# Patient Record
Sex: MALE | Race: WHITE | Employment: FULL TIME | URBAN - METROPOLITAN AREA
[De-identification: names, ages, dates, MRNs, and addresses within clinical notes are randomized per-mention and may not be internally consistent; named-entity substitution may affect disease eponyms.]

---

## 2018-04-09 ENCOUNTER — APPOINTMENT (OUTPATIENT)
Dept: GENERAL RADIOLOGY | Age: 63
DRG: 190 | End: 2018-04-09
Attending: EMERGENCY MEDICINE
Payer: COMMERCIAL

## 2018-04-09 ENCOUNTER — HOSPITAL ENCOUNTER (INPATIENT)
Age: 63
LOS: 1 days | Discharge: HOME | DRG: 190 | End: 2018-04-10
Attending: EMERGENCY MEDICINE | Admitting: HOSPITALIST
Payer: COMMERCIAL

## 2018-04-09 DIAGNOSIS — J44.1 COPD EXACERBATION (HCC): Primary | ICD-10-CM

## 2018-04-09 DIAGNOSIS — Z72.0 TOBACCO ABUSE: Chronic | ICD-10-CM

## 2018-04-09 DIAGNOSIS — J96.22 ACUTE ON CHRONIC RESPIRATORY FAILURE WITH HYPOXIA AND HYPERCAPNIA (HCC): ICD-10-CM

## 2018-04-09 DIAGNOSIS — J96.01 ACUTE RESPIRATORY FAILURE WITH HYPOXIA AND HYPERCAPNIA (HCC): ICD-10-CM

## 2018-04-09 DIAGNOSIS — J96.21 ACUTE ON CHRONIC RESPIRATORY FAILURE WITH HYPOXIA AND HYPERCAPNIA (HCC): ICD-10-CM

## 2018-04-09 DIAGNOSIS — J96.02 ACUTE RESPIRATORY FAILURE WITH HYPOXIA AND HYPERCAPNIA (HCC): ICD-10-CM

## 2018-04-09 PROBLEM — Z88.9 H/O SEASONAL ALLERGIES: Chronic | Status: ACTIVE | Noted: 2018-04-09

## 2018-04-09 PROBLEM — J44.9 COPD (CHRONIC OBSTRUCTIVE PULMONARY DISEASE) (HCC): Status: ACTIVE | Noted: 2018-04-09

## 2018-04-09 PROBLEM — J44.9 COPD (CHRONIC OBSTRUCTIVE PULMONARY DISEASE) (HCC): Status: RESOLVED | Noted: 2018-04-09 | Resolved: 2018-04-09

## 2018-04-09 LAB
ALBUMIN SERPL-MCNC: 3.6 G/DL (ref 3.2–4.6)
ALBUMIN/GLOB SERPL: 0.8 {RATIO} (ref 1.2–3.5)
ALP SERPL-CCNC: 91 U/L (ref 50–136)
ALT SERPL-CCNC: 52 U/L (ref 12–65)
ANION GAP SERPL CALC-SCNC: 10 MMOL/L (ref 7–16)
ARTERIAL PATENCY WRIST A: POSITIVE
ARTERIAL PATENCY WRIST A: POSITIVE
AST SERPL-CCNC: 35 U/L (ref 15–37)
ATRIAL RATE: 127 BPM
BASE DEFICIT BLDA-SCNC: 1.5 MMOL/L (ref 0–2)
BASE DEFICIT BLDA-SCNC: 2.8 MMOL/L (ref 0–2)
BASOPHILS # BLD: 0.2 K/UL (ref 0–0.2)
BASOPHILS NFR BLD: 1 % (ref 0–2)
BDY SITE: ABNORMAL
BDY SITE: NORMAL
BILIRUB SERPL-MCNC: 0.3 MG/DL (ref 0.2–1.1)
BUN SERPL-MCNC: 20 MG/DL (ref 8–23)
CALCIUM SERPL-MCNC: 8.6 MG/DL (ref 8.3–10.4)
CALCULATED P AXIS, ECG09: 77 DEGREES
CALCULATED R AXIS, ECG10: 77 DEGREES
CALCULATED T AXIS, ECG11: 102 DEGREES
CHLORIDE SERPL-SCNC: 104 MMOL/L (ref 98–107)
CO2 SERPL-SCNC: 27 MMOL/L (ref 21–32)
COHGB MFR BLD: 0.5 % (ref 0.5–1.5)
CREAT SERPL-MCNC: 0.89 MG/DL (ref 0.8–1.5)
DIAGNOSIS, 93000: NORMAL
DIFFERENTIAL METHOD BLD: ABNORMAL
DO-HGB BLD-MCNC: 11 % (ref 0–5)
EOSINOPHIL # BLD: 4 K/UL (ref 0–0.8)
EOSINOPHIL NFR BLD: 24 % (ref 0.5–7.8)
ERYTHROCYTE [DISTWIDTH] IN BLOOD BY AUTOMATED COUNT: 14.7 % (ref 11.9–14.6)
GAS FLOW.O2 O2 DELIVERY SYS: 40 L/MIN
GLOBULIN SER CALC-MCNC: 4.6 G/DL (ref 2.3–3.5)
GLUCOSE SERPL-MCNC: 202 MG/DL (ref 65–100)
HCO3 BLDA-SCNC: 24 MMOL/L (ref 22–26)
HCO3 BLDA-SCNC: 25 MMOL/L (ref 22–26)
HCT VFR BLD AUTO: 47.4 % (ref 41.1–50.3)
HGB BLD-MCNC: 16.1 G/DL (ref 13.6–17.2)
HGB BLDMV-MCNC: 16.6 GM/DL (ref 11.7–15)
IMM GRANULOCYTES # BLD: 0.1 K/UL (ref 0–0.5)
IMM GRANULOCYTES NFR BLD AUTO: 0 % (ref 0–5)
LYMPHOCYTES # BLD: 4.3 K/UL (ref 0.5–4.6)
LYMPHOCYTES NFR BLD: 26 % (ref 13–44)
MAGNESIUM SERPL-MCNC: 1.9 MG/DL (ref 1.8–2.4)
MCH RBC QN AUTO: 30.1 PG (ref 26.1–32.9)
MCHC RBC AUTO-ENTMCNC: 34 G/DL (ref 31.4–35)
MCV RBC AUTO: 88.8 FL (ref 79.6–97.8)
METHGB MFR BLD: 0.1 % (ref 0–1.5)
MONOCYTES # BLD: 1.4 K/UL (ref 0.1–1.3)
MONOCYTES NFR BLD: 9 % (ref 4–12)
NEUTS SEG # BLD: 6.7 K/UL (ref 1.7–8.2)
NEUTS SEG NFR BLD: 40 % (ref 43–78)
OXYHGB MFR BLDA: 88 % (ref 94–97)
PCO2 BLDA: 45 MMHG (ref 35–45)
PCO2 BLDA: 53 MMHG (ref 35–45)
PH BLDA: 7.29 [PH] (ref 7.35–7.45)
PH BLDA: 7.35 [PH] (ref 7.35–7.45)
PLATELET # BLD AUTO: 610 K/UL (ref 150–450)
PMV BLD AUTO: 9.8 FL (ref 10.8–14.1)
PO2 BLDA: 57 MMHG (ref 80–105)
PO2 BLDA: 82 MMHG (ref 80–105)
POTASSIUM SERPL-SCNC: 3.6 MMOL/L (ref 3.5–5.1)
PROT SERPL-MCNC: 8.2 G/DL (ref 6.3–8.2)
Q-T INTERVAL, ECG07: 288 MS
QRS DURATION, ECG06: 80 MS
QTC CALCULATION (BEZET), ECG08: 405 MS
RBC # BLD AUTO: 5.34 M/UL (ref 4.23–5.67)
SAO2 % BLD: 89 % (ref 92–98.5)
SERVICE CMNT-IMP: ABNORMAL
SERVICE CMNT-IMP: NORMAL
SODIUM SERPL-SCNC: 141 MMOL/L (ref 136–145)
TROPONIN I SERPL-MCNC: <0.04 NG/ML (ref 0.02–0.05)
VENTILATION MODE VENT: NORMAL
VENTRICULAR RATE, ECG03: 119 BPM
WBC # BLD AUTO: 16.7 K/UL (ref 4.3–11.1)

## 2018-04-09 PROCEDURE — 74011250637 HC RX REV CODE- 250/637: Performed by: HOSPITALIST

## 2018-04-09 PROCEDURE — 94640 AIRWAY INHALATION TREATMENT: CPT

## 2018-04-09 PROCEDURE — 77030012793 HC CIRC VNTLTR FISP -B

## 2018-04-09 PROCEDURE — 74011250636 HC RX REV CODE- 250/636: Performed by: HOSPITALIST

## 2018-04-09 PROCEDURE — 74011000250 HC RX REV CODE- 250

## 2018-04-09 PROCEDURE — 99284 EMERGENCY DEPT VISIT MOD MDM: CPT | Performed by: EMERGENCY MEDICINE

## 2018-04-09 PROCEDURE — 77030021668 HC NEB PREFIL KT VYRM -A

## 2018-04-09 PROCEDURE — 65270000029 HC RM PRIVATE

## 2018-04-09 PROCEDURE — 99254 IP/OBS CNSLTJ NEW/EST MOD 60: CPT | Performed by: INTERNAL MEDICINE

## 2018-04-09 PROCEDURE — 74011000250 HC RX REV CODE- 250: Performed by: EMERGENCY MEDICINE

## 2018-04-09 PROCEDURE — 85025 COMPLETE CBC W/AUTO DIFF WBC: CPT | Performed by: EMERGENCY MEDICINE

## 2018-04-09 PROCEDURE — 77030012794 HC KT NSL CANN/HGH TRAN -A

## 2018-04-09 PROCEDURE — 77030027138 HC INCENT SPIROMETER -A

## 2018-04-09 PROCEDURE — 94760 N-INVAS EAR/PLS OXIMETRY 1: CPT

## 2018-04-09 PROCEDURE — 87070 CULTURE OTHR SPECIMN AEROBIC: CPT | Performed by: HOSPITALIST

## 2018-04-09 PROCEDURE — 77030032490 HC SLV COMPR SCD KNE COVD -B

## 2018-04-09 PROCEDURE — 82803 BLOOD GASES ANY COMBINATION: CPT

## 2018-04-09 PROCEDURE — 77030013140 HC MSK NEB VYRM -A

## 2018-04-09 PROCEDURE — 77030020120 HC VLV RESP PEP HI -B

## 2018-04-09 PROCEDURE — 77010033678 HC OXYGEN DAILY

## 2018-04-09 PROCEDURE — 71045 X-RAY EXAM CHEST 1 VIEW: CPT

## 2018-04-09 PROCEDURE — 84484 ASSAY OF TROPONIN QUANT: CPT | Performed by: EMERGENCY MEDICINE

## 2018-04-09 PROCEDURE — 96374 THER/PROPH/DIAG INJ IV PUSH: CPT | Performed by: EMERGENCY MEDICINE

## 2018-04-09 PROCEDURE — 74011250636 HC RX REV CODE- 250/636: Performed by: EMERGENCY MEDICINE

## 2018-04-09 PROCEDURE — 80053 COMPREHEN METABOLIC PANEL: CPT | Performed by: EMERGENCY MEDICINE

## 2018-04-09 PROCEDURE — 36600 WITHDRAWAL OF ARTERIAL BLOOD: CPT

## 2018-04-09 PROCEDURE — 74011000250 HC RX REV CODE- 250: Performed by: HOSPITALIST

## 2018-04-09 PROCEDURE — 83735 ASSAY OF MAGNESIUM: CPT | Performed by: EMERGENCY MEDICINE

## 2018-04-09 PROCEDURE — 94761 N-INVAS EAR/PLS OXIMETRY MLT: CPT | Performed by: EMERGENCY MEDICINE

## 2018-04-09 PROCEDURE — 93005 ELECTROCARDIOGRAM TRACING: CPT | Performed by: EMERGENCY MEDICINE

## 2018-04-09 RX ORDER — ALBUTEROL SULFATE 0.83 MG/ML
5 SOLUTION RESPIRATORY (INHALATION)
Status: DISPENSED | OUTPATIENT
Start: 2018-04-09 | End: 2018-04-09

## 2018-04-09 RX ORDER — IPRATROPIUM BROMIDE AND ALBUTEROL SULFATE 2.5; .5 MG/3ML; MG/3ML
3 SOLUTION RESPIRATORY (INHALATION)
Status: COMPLETED | OUTPATIENT
Start: 2018-04-09 | End: 2018-04-09

## 2018-04-09 RX ORDER — MORPHINE SULFATE 2 MG/ML
2 INJECTION, SOLUTION INTRAMUSCULAR; INTRAVENOUS
Status: DISCONTINUED | OUTPATIENT
Start: 2018-04-09 | End: 2018-04-10 | Stop reason: HOSPADM

## 2018-04-09 RX ORDER — HYDROCODONE BITARTRATE AND ACETAMINOPHEN 5; 325 MG/1; MG/1
1 TABLET ORAL
Status: DISCONTINUED | OUTPATIENT
Start: 2018-04-09 | End: 2018-04-10 | Stop reason: HOSPADM

## 2018-04-09 RX ORDER — MAGNESIUM SULFATE 1 G/100ML
1 INJECTION INTRAVENOUS ONCE
Status: COMPLETED | OUTPATIENT
Start: 2018-04-09 | End: 2018-04-09

## 2018-04-09 RX ORDER — SODIUM CHLORIDE 0.9 % (FLUSH) 0.9 %
5-10 SYRINGE (ML) INJECTION EVERY 8 HOURS
Status: DISCONTINUED | OUTPATIENT
Start: 2018-04-09 | End: 2018-04-10 | Stop reason: HOSPADM

## 2018-04-09 RX ORDER — LORATADINE 10 MG/1
10 TABLET ORAL DAILY
Status: DISCONTINUED | OUTPATIENT
Start: 2018-04-09 | End: 2018-04-10 | Stop reason: HOSPADM

## 2018-04-09 RX ORDER — IPRATROPIUM BROMIDE AND ALBUTEROL SULFATE 2.5; .5 MG/3ML; MG/3ML
3 SOLUTION RESPIRATORY (INHALATION)
Status: DISCONTINUED | OUTPATIENT
Start: 2018-04-09 | End: 2018-04-09

## 2018-04-09 RX ORDER — HEPARIN SODIUM 5000 [USP'U]/ML
5000 INJECTION, SOLUTION INTRAVENOUS; SUBCUTANEOUS EVERY 8 HOURS
Status: DISCONTINUED | OUTPATIENT
Start: 2018-04-09 | End: 2018-04-10 | Stop reason: HOSPADM

## 2018-04-09 RX ORDER — SODIUM CHLORIDE 0.9 % (FLUSH) 0.9 %
5-10 SYRINGE (ML) INJECTION AS NEEDED
Status: DISCONTINUED | OUTPATIENT
Start: 2018-04-09 | End: 2018-04-10 | Stop reason: HOSPADM

## 2018-04-09 RX ORDER — ONDANSETRON 2 MG/ML
4 INJECTION INTRAMUSCULAR; INTRAVENOUS
Status: DISCONTINUED | OUTPATIENT
Start: 2018-04-09 | End: 2018-04-10 | Stop reason: HOSPADM

## 2018-04-09 RX ORDER — IPRATROPIUM BROMIDE AND ALBUTEROL SULFATE 2.5; .5 MG/3ML; MG/3ML
3 SOLUTION RESPIRATORY (INHALATION)
Status: DISCONTINUED | OUTPATIENT
Start: 2018-04-09 | End: 2018-04-10

## 2018-04-09 RX ORDER — ACETAMINOPHEN 325 MG/1
650 TABLET ORAL
Status: DISCONTINUED | OUTPATIENT
Start: 2018-04-09 | End: 2018-04-10 | Stop reason: HOSPADM

## 2018-04-09 RX ORDER — GUAIFENESIN 600 MG/1
1200 TABLET, EXTENDED RELEASE ORAL EVERY 12 HOURS
Status: DISCONTINUED | OUTPATIENT
Start: 2018-04-09 | End: 2018-04-10 | Stop reason: HOSPADM

## 2018-04-09 RX ORDER — BUDESONIDE 0.5 MG/2ML
500 INHALANT ORAL
Status: DISCONTINUED | OUTPATIENT
Start: 2018-04-09 | End: 2018-04-10 | Stop reason: HOSPADM

## 2018-04-09 RX ORDER — IPRATROPIUM BROMIDE AND ALBUTEROL SULFATE 2.5; .5 MG/3ML; MG/3ML
SOLUTION RESPIRATORY (INHALATION)
Status: COMPLETED
Start: 2018-04-09 | End: 2018-04-09

## 2018-04-09 RX ORDER — LEVOFLOXACIN 5 MG/ML
750 INJECTION, SOLUTION INTRAVENOUS EVERY 24 HOURS
Status: DISCONTINUED | OUTPATIENT
Start: 2018-04-09 | End: 2018-04-10

## 2018-04-09 RX ORDER — MONTELUKAST SODIUM 10 MG/1
10 TABLET ORAL
Status: DISCONTINUED | OUTPATIENT
Start: 2018-04-09 | End: 2018-04-10 | Stop reason: HOSPADM

## 2018-04-09 RX ORDER — ALBUTEROL SULFATE 0.83 MG/ML
10 SOLUTION RESPIRATORY (INHALATION)
Status: COMPLETED | OUTPATIENT
Start: 2018-04-09 | End: 2018-04-09

## 2018-04-09 RX ADMIN — HYDROCODONE BITARTRATE AND ACETAMINOPHEN 1 TABLET: 5; 325 TABLET ORAL at 13:41

## 2018-04-09 RX ADMIN — HEPARIN SODIUM 5000 UNITS: 5000 INJECTION, SOLUTION INTRAVENOUS; SUBCUTANEOUS at 05:21

## 2018-04-09 RX ADMIN — Medication 10 ML: at 11:11

## 2018-04-09 RX ADMIN — METHYLPREDNISOLONE SODIUM SUCCINATE 80 MG: 125 INJECTION, POWDER, FOR SOLUTION INTRAMUSCULAR; INTRAVENOUS at 15:14

## 2018-04-09 RX ADMIN — CHLORASEPTIC 1 SPRAY: 1.5 LIQUID ORAL at 23:00

## 2018-04-09 RX ADMIN — IPRATROPIUM BROMIDE AND ALBUTEROL SULFATE 3 ML: 2.5; .5 SOLUTION RESPIRATORY (INHALATION) at 02:18

## 2018-04-09 RX ADMIN — MONTELUKAST SODIUM 10 MG: 10 TABLET, FILM COATED ORAL at 05:22

## 2018-04-09 RX ADMIN — MONTELUKAST SODIUM 10 MG: 10 TABLET, FILM COATED ORAL at 21:04

## 2018-04-09 RX ADMIN — MORPHINE SULFATE 2 MG: 2 INJECTION, SOLUTION INTRAMUSCULAR; INTRAVENOUS at 11:11

## 2018-04-09 RX ADMIN — ALBUTEROL SULFATE 10 MG: 2.5 SOLUTION RESPIRATORY (INHALATION) at 02:42

## 2018-04-09 RX ADMIN — MAGNESIUM SULFATE HEPTAHYDRATE 1 G: 1 INJECTION, SOLUTION INTRAVENOUS at 05:23

## 2018-04-09 RX ADMIN — LORATADINE 10 MG: 10 TABLET ORAL at 05:22

## 2018-04-09 RX ADMIN — BUDESONIDE 500 MCG: 0.5 INHALANT RESPIRATORY (INHALATION) at 20:49

## 2018-04-09 RX ADMIN — IPRATROPIUM BROMIDE AND ALBUTEROL SULFATE 3 ML: 2.5; .5 SOLUTION RESPIRATORY (INHALATION) at 07:57

## 2018-04-09 RX ADMIN — GUAIFENESIN 1200 MG: 600 TABLET, EXTENDED RELEASE ORAL at 09:24

## 2018-04-09 RX ADMIN — Medication 10 ML: at 15:14

## 2018-04-09 RX ADMIN — Medication 10 ML: at 21:10

## 2018-04-09 RX ADMIN — HEPARIN SODIUM 5000 UNITS: 5000 INJECTION, SOLUTION INTRAVENOUS; SUBCUTANEOUS at 21:10

## 2018-04-09 RX ADMIN — Medication 10 ML: at 21:06

## 2018-04-09 RX ADMIN — IPRATROPIUM BROMIDE AND ALBUTEROL SULFATE 3 ML: 2.5; .5 SOLUTION RESPIRATORY (INHALATION) at 20:49

## 2018-04-09 RX ADMIN — IPRATROPIUM BROMIDE AND ALBUTEROL SULFATE 3 ML: 2.5; .5 SOLUTION RESPIRATORY (INHALATION) at 11:16

## 2018-04-09 RX ADMIN — Medication 10 ML: at 05:23

## 2018-04-09 RX ADMIN — METHYLPREDNISOLONE SODIUM SUCCINATE 80 MG: 125 INJECTION, POWDER, FOR SOLUTION INTRAMUSCULAR; INTRAVENOUS at 09:24

## 2018-04-09 RX ADMIN — HEPARIN SODIUM 5000 UNITS: 5000 INJECTION, SOLUTION INTRAVENOUS; SUBCUTANEOUS at 13:41

## 2018-04-09 RX ADMIN — BUDESONIDE 500 MCG: 0.5 INHALANT RESPIRATORY (INHALATION) at 07:56

## 2018-04-09 RX ADMIN — METHYLPREDNISOLONE SODIUM SUCCINATE 80 MG: 125 INJECTION, POWDER, FOR SOLUTION INTRAMUSCULAR; INTRAVENOUS at 21:06

## 2018-04-09 RX ADMIN — GUAIFENESIN 1200 MG: 600 TABLET, EXTENDED RELEASE ORAL at 21:04

## 2018-04-09 RX ADMIN — IPRATROPIUM BROMIDE AND ALBUTEROL SULFATE 3 ML: 2.5; .5 SOLUTION RESPIRATORY (INHALATION) at 02:24

## 2018-04-09 RX ADMIN — METHYLPREDNISOLONE SODIUM SUCCINATE 125 MG: 125 INJECTION, POWDER, FOR SOLUTION INTRAMUSCULAR; INTRAVENOUS at 02:32

## 2018-04-09 RX ADMIN — IPRATROPIUM BROMIDE AND ALBUTEROL SULFATE 3 ML: 2.5; .5 SOLUTION RESPIRATORY (INHALATION) at 15:41

## 2018-04-09 RX ADMIN — LEVOFLOXACIN 750 MG: 5 INJECTION, SOLUTION INTRAVENOUS at 05:22

## 2018-04-09 RX ADMIN — MORPHINE SULFATE 2 MG: 2 INJECTION, SOLUTION INTRAMUSCULAR; INTRAVENOUS at 05:12

## 2018-04-09 NOTE — ED PROVIDER NOTES
HPI Comments: History is obtained from the patient's wife. She states that he stopped smoking 11 days ago, has COPD. He uses 2 different inhalers at home. Since he stopped smoking, his breathing seems to have gotten worse. He has been coughing incessantly per his wife. His symptoms continue to get worse until tonight when she got concerned and brought him here. The patient currently is unable to provide any meaningful history due to his severe storied distress. Elements of this note were created using speech recognition software. As such, errors of speech recognition may be present. Patient is a 58 y.o. male presenting with respiratory distress syndrome. The history is provided by the spouse. The history is limited by the condition of the patient. Respiratory Distress   Associated symptoms include cough. Pertinent negatives include no fever and no vomiting. Past Medical History:   Diagnosis Date    Allergic rhinitis     mild    Attention deficit hyperactivity disorder (ADHD) 12/20/2016    BPPV (benign paroxysmal positional vertigo) 12/20/2016    Chronic obstructive pulmonary disease ([address]     Ocular migraine 12/20/2016       No past surgical history on file. Family History:   Problem Relation Age of Onset    No Known Problems Mother      shingles    No Known Problems Father        Social History     Social History    Marital status:      Spouse name: N/A    Number of children: N/A    Years of education: N/A     Occupational History    Not on file.      Social History Main Topics    Smoking status: Former Smoker     Packs/day: 1.00     Years: 40.00     Types: Cigarettes     Quit date: 3/30/2018    Smokeless tobacco: Never Used      Comment: tried to quit in past, wellbutrin, patches,     Alcohol use Yes      Comment: Very Rare    Drug use: Not on file    Sexual activity: Not on file     Other Topics Concern    Blood Transfusions No    Caffeine Concern No     2-3 c coffee, no soda    Exercise No      active in work, up and down stairs     Social History Narrative    Malini Churchill- Wife        Currently Lluvia Mitchell,  -->  supervisor                 ALLERGIES: Review of patient's allergies indicates no known allergies. Review of Systems   Constitutional: Negative for chills and fever. Respiratory: Positive for cough and shortness of breath. Gastrointestinal: Negative for nausea and vomiting. All other systems reviewed and are negative. Vitals:    04/09/18 0220 04/09/18 0221   BP: (!) 169/105    Pulse: (!) 104    Resp: 26    SpO2: (!) 81% 93%   Weight: 66.7 kg (147 lb)    Height: 5' 8.5\" (1.74 m)             Physical Exam   Constitutional: He appears well-developed and well-nourished. He appears distressed. Thin white male who appears in acute respiratory distress. Unable to speak   HENT:   Head: Normocephalic and atraumatic. Eyes: Conjunctivae are normal. Pupils are equal, round, and reactive to light. Neck: Normal range of motion. Neck supple. Pulmonary/Chest: He is in respiratory distress. He has wheezes. He has no rales. Abdominal: Soft. Bowel sounds are normal.   Musculoskeletal: He exhibits no edema or tenderness. Skin: Skin is warm and dry. Nursing note and vitals reviewed. MDM  Number of Diagnoses or Management Options  Diagnosis management comments: 2:40 AM patient is currently on a continuous neb, will get ABG  3:35 AM ABG shows pCO2 52.5 and pO2 56.6. Will continue albuterol treatment  3:51 AM Pt appears more comfortable, is able to speak in 3-4 word sentences. He still has extensive wheezing and is requiring oxygen. I spoke with Dr. Grace Platt, to see patient for admission. At this point I do not feel that he needs BiPAP.     Total critical care time spent on initial evaluation, repeat evaluations, interpreting lab results and chest x-ray, obtaining additional history from family and speaking with consultants was 39 minutes       Amount and/or Complexity of Data Reviewed  Clinical lab tests: ordered and reviewed  Tests in the radiology section of CPT®: ordered and reviewed  Tests in the medicine section of CPT®: ordered and reviewed  Discuss the patient with other providers: yes  Independent visualization of images, tracings, or specimens: yes    Risk of Complications, Morbidity, and/or Mortality  Presenting problems: high  Diagnostic procedures: moderate  Management options: high    Critical Care  Total time providing critical care: 30-74 minutes    Patient Progress  Patient progress: improved        ED Course       Procedures

## 2018-04-09 NOTE — Clinical Note
Status[de-identified] Inpatient [101] Type of Bed: Telemetry [19] Inpatient Hospitalization Certified Necessary for the Following Reasons: 3. Patient receiving treatment that can only be provided in an inpatient setting (further clarification in H&P documentation) Admitting Diagnosis: COPD (chronic obstructive pulmonary disease) ([facility] [[id number]] Admitting Physician: Karen Tijerina [[id number]] Attending Physician: Karen Tijerina [[id number]] Estimated Length of Stay: 3-4 Midnights Discharge Plan[de-identified] Home with Office Follow-up

## 2018-04-09 NOTE — PROGRESS NOTES
Problem: Breathing Pattern - Ineffective  Goal: *Absence of hypoxia  Outcome: Progressing Towards Goal  Able to wean pt from Airvo to 3L NC. No increased WOB or SOB noted. Will monitor.

## 2018-04-09 NOTE — PROGRESS NOTES
Mr. Idalmis Hay is a 57 yo M with PMHx of COPD (diagnosed in 2017 - not seen by pulmonary), ADHD who presented to ED with increased SOB and expiratory wheezing for last 10-11 days. She was diagnosed with COPD exacerbation secondary to viral URI and started on DuoNebs Q4H, steroids and levofloxacin. This morning, he reports improvement in dyspnea and cough. He has been weaned to 2L via NC. PT/OT was consulted. He continues to have bilateral wheezing.

## 2018-04-09 NOTE — PROGRESS NOTES
Care Management Interventions  Transition of Care Consult (CM Consult): Discharge Planning  Current Support Network: Lives with Spouse  Plan discussed with Pt/Family/Caregiver: Yes  Nahum - 58 M admitted 4/9 with ARF to ICU. Hx COPD. Lives at home with spouse. Plans to return home. Goal is to get out of bed today (4/9). Has PCP. Does not have O2 at home. Has PCP.

## 2018-04-09 NOTE — PROGRESS NOTES
58 M admitted 4/9 with ARF. Hx COPD. Lives at home with spouse. Plans to return home with spouse. Has PCP.

## 2018-04-09 NOTE — PROGRESS NOTES
Pt admitted to room 373 per stretcher from ER with oxygen and nurse. Pt able to transfer self to bed. Pt very short of breath with activity and at rest. Pt given CHG bath. Pt placed on monitor. Pt asked not to get up out of bed without calling for assistance. No skin breakdown. Urinal at bedside. Specimen cup at bedside, pt instructed to call when he has used cup. Audible wheezing. RT at bedside for treatment. All medications given. levaquin infusing. New iv to right forearm, 20 G for magnesium bolus.

## 2018-04-09 NOTE — IP AVS SNAPSHOT
[address] 
[phone] Patient: Kendell Robins MRN: [id number] :[id number] About your hospitalization You were admitted on:  2018 You last received care in the:  [address] [place] [address] You were discharged on:  April 10, 2018 Why you were hospitalized Your primary diagnosis was:  Copd (Chronic Obstructive Pulmonary Disease) (Hcc) Your diagnoses also included:  Acute Respiratory Failure With Hypoxia And Hypercapnia (Hcc), Tobacco Abuse, H/O Seasonal Allergies, Copd Exacerbation ([facility]) Follow-up Information Follow up With Details Comments Contact Info Linda Lawton MD In 1 month APPT. MAY 10th @ 9:30 WILL SEE  [facility] [address] [facility] [address] 
[phone] Felipa Fonseca MD On 2018 9:45AM 12 [facility] [address] 
[phone] Your Scheduled Appointments 2018  9:45 AM EDT Extended Office Visit with Felipa Fonseca MD  
[facility] ([address]) [facility] 700 [facility]  
[phone] Thursday May 10, 2018 10:00 AM EDT  
(Arrive by 9:30 AM) HOSPITAL with [facility] ([facility]) 75 [facility] [address]  
[phone] Discharge Orders None A check chica indicates which time of day the medication should be taken. My Medications START taking these medications Instructions Each Dose to Equal  
 Morning Noon Evening Bedtime  
 levoFLOXacin 500 mg tablet Commonly known as:  Donovan Formosa Start taking on:  2018 Your next dose is:  TOMORROW Take 1 Tab by mouth every twenty-four (24) hours for 2 days. 500 mg  
    
   
   
   
  
 montelukast 10 mg tablet Commonly known as:  SINGULAIR  
 Your next dose is:  TODAY Take 1 Tab by mouth nightly. 10 mg  
    
   
   
   
  
  
 predniSONE 20 mg tablet Commonly known as:  Alcario Shires Start taking on:  4/11/2018 Your next dose is:  TOMORROW Take 2 Tabs by mouth daily (with breakfast) for 2 days. 40 mg CONTINUE taking these medications Instructions Each Dose to Equal  
 Morning Noon Evening Bedtime  
 ipratropium-albuterol  mcg/actuation inhaler Commonly known as:  Julieta Nest Take 1 Puff by inhalation every six (6) hours as needed for Wheezing. 1 Puff  
    
   
   
   
  
 umeclidinium-vilanterol 62.5-25 mcg/actuation inhaler Commonly known as:  Tello Crutch Your next dose is:  TOMORROW Take 1 Puff by inhalation daily. 1 Puff Where to Get Your Medications Information on where to get these meds will be given to you by the nurse or doctor. ! Ask your nurse or doctor about these medications  
  levoFLOXacin 500 mg tablet  
 montelukast 10 mg tablet  
 predniSONE 20 mg tablet Discharge Instructions DISCHARGE SUMMARY from Nurse The following personal items are in your possession at time of discharge: 
 
Dental Appliances: At home, Lowers, Uppers Visual Aid: At bedside, Glasses Home Medications: None Jewelry: None Clothing: Jacket/Coat, Pants, Socks Other Valuables: None Personal Items Sent to Safe: none PATIENT INSTRUCTIONS: 
 
After general anesthesia or intravenous sedation, for 24 hours or while taking prescription Narcotics: · Limit your activities · Do not drive and operate hazardous machinery · Do not make important personal or business decisions · Do  not drink alcoholic beverages · If you have not urinated within 8 hours after discharge, please contact your surgeon on call. Report the following to your surgeon: · Excessive pain, swelling, redness or odor of or around the surgical area · Temperature over 100.5 · Nausea and vomiting lasting longer than 4 hours or if unable to take medications · Any signs of decreased circulation or nerve impairment to extremity: change in color, persistent  numbness, tingling, coldness or increase pain · Any questions What to do at Home: 
Recommended activity: Activity as tolerated, per MD 
 
If you experience any of the following symptoms fever>101, pain unrelieved with medication, nausea/vomiting, shortness of breath, dizziness/fainting, chest pain. , please follow up with your doctor. *  Please give a list of your current medications to your Primary Care Provider. *  Please update this list whenever your medications are discontinued, doses are 
    changed, or new medications (including over-the-counter products) are added. *  Please carry medication information at all times in case of emergency situations. These are general instructions for a healthy lifestyle: No smoking/ No tobacco products/ Avoid exposure to second hand smoke Surgeon General's Warning:  Quitting smoking now greatly reduces serious risk to your health. Obesity, smoking, and sedentary lifestyle greatly increases your risk for illness A healthy diet, regular physical exercise & weight monitoring are important for maintaining a healthy lifestyle You may be retaining fluid if you have a history of heart failure or if you experience any of the following symptoms:  Weight gain of 3 pounds or more overnight or 5 pounds in a week, increased swelling in our hands or feet or shortness of breath while lying flat in bed. Please call your doctor as soon as you notice any of these symptoms; do not wait until your next office visit. Recognize signs and symptoms of STROKE: 
 
F-face looks uneven A-arms unable to move or move unevenly S-speech slurred or non-existent T-time-call 911 as soon as signs and symptoms begin-DO NOT go Back to bed or wait to see if you get better-TIME IS BRAIN. Warning Signs of HEART ATTACK Call 911 if you have these symptoms: 
? Chest discomfort. Most heart attacks involve discomfort in the center of the chest that lasts more than a few minutes, or that goes away and comes back. It can feel like uncomfortable pressure, squeezing, fullness, or pain. ? Discomfort in other areas of the upper body. Symptoms can include pain or discomfort in one or both arms, the back, neck, jaw, or stomach. ? Shortness of breath with or without chest discomfort. ? Other signs may include breaking out in a cold sweat, nausea, or lightheadedness. Don't wait more than five minutes to call [address]! Fast action can save your life. Calling 911 is almost always the fastest way to get lifesaving treatment. Emergency Medical Services staff can begin treatment when they arrive  up to an hour sooner than if someone gets to the hospital by car. The discharge information has been reviewed with the patient. The patient verbalized understanding. Discharge medications reviewed with the patient and appropriate educational materials and side effects teaching were provided. COPD Exacerbation Plan: Care Instructions Your Care Instructions If you have chronic obstructive pulmonary disease (COPD), your usual shortness of breath could suddenly get worse. You may start coughing more and have more mucus. This flare-up is called a COPD exacerbation (say \"[id number]-RUB-bf-BAY-shun\"). A lung infection or air pollution could set off an exacerbation. Sometimes it can happen after a quick change in temperature or being around chemicals. Work with your doctor to make a plan for dealing with an exacerbation. You can better manage it if you plan ahead. Follow-up care is a key part of your treatment and safety.  Be sure to make and go to all appointments, and call your doctor if you are having problems. It's also a good idea to know your test results and keep a list of the medicines you take. How can you care for yourself at home? During an exacerbation · Do not panic if you start to have one. Quick treatment at home may help you prevent serious breathing problems. If you have a COPD exacerbation plan that you developed with your doctor, follow it. · Take your medicines exactly as your doctor tells you. ¨ Use your inhaler as directed by your doctor. If your symptoms do not get better after you use your medicine, have someone take you to the emergency room. Call an ambulance if necessary. ¨ With inhaled medicines, a spacer or a nebulizer may help you get more medicine to your lungs. Ask your doctor or pharmacist how to use them properly. Practice using the spacer in front of a mirror before you have an exacerbation. This may help you get the medicine into your lungs quickly. ¨ If your doctor has given you steroid pills, take them as directed. ¨ Your doctor may have given you a prescription for antibiotics, which you can fill if you need it. ¨ Talk to your doctor if you have any problems with your medicine. And call your doctor if you have to use your antibiotic or steroid pills. Preventing an exacerbation · Do not smoke. This is the most important step you can take to prevent more damage to your lungs and prevent problems. If you already smoke, it is never too late to stop. If you need help quitting, talk to your doctor about stop-smoking programs and medicines. These can increase your chances of quitting for good. · Take your daily medicines as prescribed. · Avoid colds and flu. ¨ Get a pneumococcal vaccine. ¨ Get a flu vaccine each year, as soon as it is available. Ask those you live or work with to do the same, so they will not get the flu and infect you. ¨ Try to stay away from people with colds or the flu. ¨ Wash your hands often. · Avoid secondhand smoke; air pollution; cold, dry air; hot, humid air; and high altitudes. Stay at home with your windows closed when air pollution is bad. · Learn breathing techniques for COPD, such as breathing through pursed lips. These techniques can help you breathe easier during an exacerbation. When should you call for help? Call 911 anytime you think you may need emergency care. For example, call if: 
? · You have severe trouble breathing. ? · You have severe chest pain. ?Call your doctor now or seek immediate medical care if: 
? · You have new or worse shortness of breath. ? · You develop new chest pain. ? · You are coughing more deeply or more often, especially if you notice more mucus or a change in the color of your mucus. ? · You cough up blood. ? · You have new or increased swelling in your legs or belly. ? · You have a fever. ? Watch closely for changes in your health, and be sure to contact your doctor if: 
? · You need to use your antibiotic or steroid pills. ? · Your symptoms are getting worse. Where can you learn more? Go to http://bay-leonel.info/. Enter V797 in the search box to learn more about \"COPD Exacerbation Plan: Care Instructions. \" Current as of: May 12, 2017 Content Version: 11.4 © 3393-0935 Aasonn. Care instructions adapted under license by [facility] (which disclaims liability or warranty for this information). If you have questions about a medical condition or this instruction, always ask your healthcare professional. [place] [address] any warranty or liability for your use of this information. Introducing [facility]! Dear Natalie Salazar: 
Thank you for requesting a Figgu account. Our records indicate that you already have an active Figgu account. You can access your account anytime at [email or web address]. [employer] Did you know that you can access your hospital and ER discharge instructions at any time in SMIC? You can also review all of your test results from your hospital stay or ER visit. Additional Information If you have questions, please visit the Frequently Asked Questions section of the Ideatoryt website at [email or web address] Remember, SMIC is NOT to be used for urgent needs. For medical emergencies, dial 911. Now available from your iPhone and Android! Introducing Morgan Woods As a [facility] patient, I wanted to make you aware of our electronic visit tool called Morgan Woods. Ayedinah Hutchins 24/7 allows you to connect within minutes with a medical provider 24 hours a day, seven days a week via a mobile device or tablet or logging into a secure website from your computer. You can access Morgan Woods from anywhere in the [place]. A virtual visit might be right for you when you have a simple condition and feel like you just dont want to get out of bed, or cant get away from work for an appointment, when your regular [facility] provider is not available (evenings, weekends or holidays), or when youre out of town and need minor care. Electronic visits cost only $49 and if the [facility] Strikeface/7 provider determines a prescription is needed to treat your condition, one can be electronically transmitted to a nearby pharmacy*. Please take a moment to enroll today if you have not already done so. The enrollment process is free and takes just a few minutes. To enroll, please download the XChanger Companieserin 24/OneShift suma to your tablet or phone, or visit [email or web address] to enroll on your computer. And, as an [address] [place] [address] patient with a [employer] account, the results of your visits will be scanned into your electronic medical record and your primary care provider will be able to view the scanned results. We urge you to continue to see your regular [facility] provider for your ongoing medical care. And while your primary care provider may not be the one available when you seek a Morgan Leonefin virtual visit, the peace of mind you get from getting a real diagnosis real time can be priceless. For more information on Meetapprojeliofin, view our Frequently Asked Questions (FAQs) at [email or web address]. Sincerely, 
 
Juan Manuel Burden MD 
Chief Medical Officer [employer] *:  certain medications cannot be prescribed via MeetapprojelioWiseryou Unresulted Labs-Please follow up with your PCP about these lab tests Order Current Status CULTURE, RESPIRATORY/SPUTUM/BRONCH W GRAM STAIN Preliminary result Providers Seen During Your Hospitalization Provider Specialty Primary office phone Cynthia Grijalva MD Emergency Medicine [phone] Morena Waller MD [facility] [phone] Your Primary Care Physician (PCP) Primary Care Physician Office Phone Office Fax [address] [phone] You are allergic to the following No active allergies Recent Documentation Height Weight BMI Smoking Status 1.727 m 62.6 kg 20.98 kg/m2 Former Smoker Emergency Contacts Name Discharge Info Relation Home Work Mobile Carolyn Gee  Spouse [3] [phone] Dolores Bowers  Child [2] [phone] Patient Belongings The following personal items are in your possession at time of discharge: 
  Dental Appliances: At home, Lowers, Uppers  Visual Aid: At bedside, Glasses      Home Medications: None   Jewelry: None  Clothing: Jacket/Coat, Pants, Socks    Other Valuables: None  Personal Items Sent to Safe: none Please provide this summary of care documentation to your next provider.  
  
  
 
  
Signatures-by signing, you are acknowledging that this After Visit Summary has been reviewed with you and you have received a copy. Patient Signature:  ____________________________________________________________ Date:  ____________________________________________________________  
  
Barry Shackle Provider Signature:  ____________________________________________________________ Date:  ____________________________________________________________

## 2018-04-09 NOTE — H&P
HOSPITALIST H&P/CONSULT  NAME:  Rakesh Tellez   Age:  58 y.o.  :   1955   DOS:               18  MRN:   [id number]  PCP: Chris Meyer MD  Consulting MD:  Treatment Team: Primary Nurse: Dillon Santos    HPI:   Mr. Efren David is a 59 yo M with PMHx of COPD (diagnosed in 2017 - not seen by pulmonary), ADHD who presented to ED with increased SOB and expiratory wheezing for last 10-11 days. Had some nonproductive cough. Denies any spikes of fever. Hx of tobacco abuse, stopped smoking 11 days prior. In found in severe respiratory distress. SaO2 in low 80;'s at presentation. Received Duoneb, IV  mg IV x1 with some improvement, able to talk, although using accessory respiratory muscle, laying at the side of the bed and unable to lay back. increased SOB. Ed work-up showed. WBC:16.7, Trop I<0.04. CXR w/o acute pathology. ABG with pH:7.29, pO2:57, pCO2:53. Wife at bedside. 10+ point ROS done and is negative except as noted in HPI. Past Medical History:   Diagnosis Date    Allergic rhinitis     mild    Attention deficit hyperactivity disorder (ADHD) 2016    BPPV (benign paroxysmal positional vertigo) 2016    Chronic obstructive pulmonary disease ([address]     Ocular migraine 2016      No past surgical history on file. Prior to Admission Medications   Prescriptions Last Dose Informant Patient Reported? Taking?   ipratropium-albuterol (COMBIVENT RESPIMAT)  mcg/actuation inhaler   No No   Sig: Take 1 Puff by inhalation every six (6) hours as needed for Wheezing. umeclidinium-vilanterol (ANORO ELLIPTA) 62.5-25 mcg/actuation inhaler   No No   Sig: Take 1 Puff by inhalation daily. Facility-Administered Medications: None     Home meds reconciled.   No Known Allergies   Social History   Substance Use Topics    Smoking status: Former Smoker     Packs/day: 1.00     Years: 40.00     Types: Cigarettes     Quit date: 3/30/2018    Smokeless tobacco: Never Used      Comment: tried to quit in past, wellbutrin, patches,     Alcohol use Yes      Comment: Very Rare      Family History   Problem Relation Age of Onset    No Known Problems Mother      shingles    No Known Problems Father       I personally reviewed home medications, social and family history. Immunization History   Administered Date(s) Administered    Influenza Vaccine (Quad) PF 09/11/2015, 11/12/2016, 10/07/2017    Pneumococcal Polysaccharide (PPSV-23) 08/14/2017     Objective:     Patient Vitals for the past 24 hrs:   Pulse Resp BP SpO2   04/09/18 0408 - - - 99 %   04/09/18 0400 - - - 94 %   04/09/18 0242 - - - 95 %   04/09/18 0237 - - - (!) 81 %   04/09/18 0221 - - - 93 %   04/09/18 0220 (!) 104 26 (!) 169/105 (!) 81 %     No data recorded. Oxygen Therapy  O2 Sat (%): 99 % (04/09/18 0408)  Pulse via Oximetry: 102 beats per minute (04/09/18 0408)  O2 Device: Nasal cannula (04/09/18 0408)  O2 Flow Rate (L/min): 2 l/min (04/09/18 0408)  Oxygen Therapy  O2 Sat (%): 99 % (04/09/18 0408)  Pulse via Oximetry: 102 beats per minute (04/09/18 0408)  O2 Device: Nasal cannula (04/09/18 0408)  O2 Flow Rate (L/min): 2 l/min (04/09/18 0408)    Physical Exam:  General:         Alert, cooperative, in respiratory distress. . Afebrile. HEENT:               NCAT. No obvious deformity. Nares normal. No drainage  Lungs:             Decreased air entry b/l. Poor air netry. Significant generalized expiratory wheezing. No rhonchi/rales. Using accessory muscle. NC2L  Cardiovascular:  Tachycardic, regular. No m/r/g. No pedal edema b/l. +2 PT/DT pulses b/l. Abdomen:       S/nt/nd. Bowel sounds normal. .   Skin:         No rashes or lesions. Not Jaundiced  Neurologic:    AAOx3. Honora E[place] No gross focal deficit. Moves all extremities.      Psychiatric:        Anxious      Data Review:   Recent Results (from the past 24 hour(s))   CBC WITH AUTOMATED DIFF    Collection Time: 04/09/18  2:28 AM   Result Value Ref Range    WBC 16.7 (H) 4.3 - 11.1 K/uL    RBC 5.34 4.23 - 5.67 M/uL    HGB 16.1 13.6 - 17.2 g/dL    HCT 47.4 41.1 - 50.3 %    MCV 88.8 79.6 - 97.8 FL    MCH 30.1 26.1 - 32.9 PG    MCHC 34.0 31.4 - 35.0 g/dL    RDW 14.7 (H) 11.9 - 14.6 %    PLATELET 734 (H) 724 - 450 K/uL    MPV 9.8 (L) 10.8 - 14.1 FL    DF AUTOMATED      NEUTROPHILS 40 (L) 43 - 78 %    LYMPHOCYTES 26 13 - 44 %    MONOCYTES 9 4.0 - 12.0 %    EOSINOPHILS 24 (H) 0.5 - 7.8 %    BASOPHILS 1 0.0 - 2.0 %    IMMATURE GRANULOCYTES 0 0.0 - 5.0 %    ABS. NEUTROPHILS 6.7 1.7 - 8.2 K/UL    ABS. LYMPHOCYTES 4.3 0.5 - 4.6 K/UL    ABS. MONOCYTES 1.4 (H) 0.1 - 1.3 K/UL    ABS. EOSINOPHILS 4.0 (H) 0.0 - 0.8 K/UL    ABS. BASOPHILS 0.2 0.0 - 0.2 K/UL    ABS. IMM. GRANS. 0.1 0.0 - 0.5 K/UL   METABOLIC PANEL, COMPREHENSIVE    Collection Time: 04/09/18  2:28 AM   Result Value Ref Range    Sodium 141 136 - 145 mmol/L    Potassium 3.6 3.5 - 5.1 mmol/L    Chloride 104 98 - 107 mmol/L    CO2 27 21 - 32 mmol/L    Anion gap 10 7 - 16 mmol/L    Glucose 202 (H) 65 - 100 mg/dL    BUN 20 8 - 23 MG/DL    Creatinine 0.89 0.8 - 1.5 MG/DL    GFR est AA >60 >60 ml/min/1.73m2    GFR est non-AA >60 >60 ml/min/1.73m2    Calcium 8.6 8.3 - 10.4 MG/DL    Bilirubin, total 0.3 0.2 - 1.1 MG/DL    ALT (SGPT) 52 12 - 65 U/L    AST (SGOT) 35 15 - 37 U/L    Alk.  phosphatase 91 50 - 136 U/L    Protein, total 8.2 6.3 - 8.2 g/dL    Albumin 3.6 3.2 - 4.6 g/dL    Globulin 4.6 (H) 2.3 - 3.5 g/dL    A-G Ratio 0.8 (L) 1.2 - 3.5     MAGNESIUM    Collection Time: 04/09/18  2:28 AM   Result Value Ref Range    Magnesium 1.9 1.8 - 2.4 mg/dL   TROPONIN I    Collection Time: 04/09/18  2:28 AM   Result Value Ref Range    Troponin-I, Qt. <0.04 0.02 - 0.05 NG/ML   BLOOD GAS, ARTERIAL    Collection Time: 04/09/18  3:38 AM   Result Value Ref Range    pH 7.29 (L) 7.35 - 7.45      PCO2 53 (H) 35 - 45 mmHg    PO2 57 (L) 80 - 105 mmHg    BICARBONATE 25 22 - 26 mmol/L    BASE DEFICIT 2.8 (H) 0 - 2 mmol/L    TOTAL HEMOGLOBIN 16.6 (H) 11.7 - 15.0 GM/DL    O2 SAT 89 (L) 92 - 98.5 %    Arterial O2 Hgb 88.0 (L) 94 - 97 %    CARBOXYHEMOGLOBIN 0.5 0.5 - 1.5 %    METHEMOGLOBIN 0.1 0.0 - 1.5 %    DEOXYHEMOGLOBIN 11 (H) 0.0 - 5.0 %    SITE RR     ALLENS TEST POSITIVE      Respiratory comment: RA at 4 9 2018 3 40 22 AM. Read back. All Micro Results     Procedure Component Value Units Date/Time    CULTURE, RESPIRATORY/SPUTUM/BRONCH Vinod Cisneros [[id number]]     Order Status:  Sent Specimen:  Sputum from Sputum           Imaging /Procedures /Studies:  I personally reviewed all labs, imaging, and other studies this admission:  CXR Results  (Last 48 hours)               04/09/18 0239  XR CHEST PORT Final result    Impression:  IMPRESSION: COPD. No acute cardiopulmonary disease. Narrative:  EXAM:  XR CHEST PORT       INDICATION:  cough / hypoxia       COMPARISON:  None. FINDINGS: A portable AP radiograph of the chest was obtained at 0234 hours. The   patient is on a cardiac monitor. COPD. The lungs are clear. .  The cardiac and   mediastinal contours and pulmonary vascularity are normal.  The bones and soft   tissues are grossly within normal limits. Assessment and Plan: Active Hospital Problems    Diagnosis Date Noted    COPD (chronic obstructive pulmonary disease) ([facility] [address] 04/09/2018    Acute respiratory failure with hypoxia and hypercapnia (HCC) 04/09/2018    Tobacco abuse 04/09/2018    H/O seasonal allergies 04/09/2018       PLAN  - IV SM, Scheduled Duoneb, Pulmicort  - continue O2 supplementation  - Mucinex  - Flutter valve  - place on Airvo - if deteriorating will need to be place on BIPAP  - pulmonary consult - discussed with Dr. Mariia Aviles  - add claritin  - admit to ICU  - possible will need intubation if not improving        DVT ppx: Heparin SQ  Code status:  Full CODE  Estimated LOS:  3-4 days  Risk assessment: high. Plan of care discussed with: patient and wife at [facility].  Care team.  Mr. Fausto Henry will need at least 2 midnights of admission.       Zeinab Segura MD  04/09/18

## 2018-04-09 NOTE — PROGRESS NOTES
TRANSFER - OUT REPORT:    Verbal report given to Amari RN(name) on Deric Hall  being transferred to [facility] surge room 360(unit) for routine progression of care       Report consisted of patients Situation, Background, Assessment and   Recommendations(SBAR). Information from the following report(s) SBAR, Kardex, ED Summary, Intake/Output, MAR, Recent Results and Cardiac Rhythm SR was reviewed with the receiving nurse. Lines:   Peripheral IV 04/09/18 Right Forearm (Active)   Site Assessment Clean, dry, & intact 4/9/2018  7:58 AM   Phlebitis Assessment 0 4/9/2018  7:58 AM   Infiltration Assessment 0 4/9/2018  7:58 AM   Dressing Status Clean, dry, & intact 4/9/2018  7:58 AM   Dressing Type Tape;Transparent 4/9/2018  7:58 AM   Hub Color/Line Status Pink;Patent; Infusing 4/9/2018  7:58 AM   Alcohol Cap Used No 4/9/2018  7:58 AM       Peripheral IV 04/09/18 Right Antecubital (Active)   Site Assessment Clean, dry, & intact 4/9/2018  7:58 AM   Phlebitis Assessment 0 4/9/2018  7:58 AM   Infiltration Assessment 0 4/9/2018  7:58 AM   Dressing Status Clean, dry, & intact 4/9/2018  7:58 AM   Dressing Type Tape;Transparent 4/9/2018  7:58 AM   Hub Color/Line Status Pink; Infusing 4/9/2018  7:58 AM   Alcohol Cap Used No 4/9/2018  7:58 AM        Opportunity for questions and clarification was provided.       Patient transported with:   O2 @ 3LNC liters

## 2018-04-09 NOTE — PROGRESS NOTES
TRANSFER - IN REPORT:    Verbal report received from gloria.rn(name) on Collins Lowery  being received from ICU(unit) for routine progression of care      Report consisted of patients Situation, Background, Assessment and   Recommendations(SBAR). Information from the following report(s) SBAR was reviewed with the receiving nurse. Opportunity for questions and clarification was provided. Assessment completed upon patients arrival to unit and care assumed.

## 2018-04-09 NOTE — CONSULTS
CONSULT NOTE    David Mraino    4/9/2018    Date of Admission:  4/9/2018    The patient's chart is reviewed and the patient is discussed with the staff. Subjective:     Patient is a 58 y.o.  male with copd  seen and evaluated at the request of Dr. Abbie Altamirano. The pt had noted increasing sob over the past 3 days. This am he woke up unable to breath and was brought emergently to the er by his wife. O2 sat was 82 initially. He has been coughing but unable to produce sputum. There has been wheezing and chest tightness. He was smoking 3 ppd but recently cut back to 1/2 ppd. Has been using 1/2 of nicotene patch. Initial abg revealed increased co2 and decreased pao2 to 53 on o2  He was admitted to icu on optiflow. Review of Systems  Constitutional: negative for fevers and chills  Eyes: negative for visual disturbance  Ears, nose, mouth, throat, and face: negative for sore throat  Cardiovascular: positive for chest pressure/discomfort  Gastrointestinal: negative for nausea and vomiting  Genitourinary:negative for frequency and dysuria  Musculoskeletal:negative for arthralgias  Neurological: positive for weakness    Patient Active Problem List   Diagnosis Code    BPPV (benign paroxysmal positional vertigo) H81.10    Ocular migraine G43. 109    Attention deficit hyperactivity disorder (ADHD) F90.9    Acute respiratory failure with hypoxia and hypercapnia (HCC) J96.01, J96.02    Tobacco abuse Z72.0    H/O seasonal allergies Z88.9    COPD exacerbation (formerly [facility]) J44.1         Prior to Admission Medications   Prescriptions Last Dose Informant Patient Reported? Taking?   ipratropium-albuterol (COMBIVENT RESPIMAT)  mcg/actuation inhaler 4/9/2018 at Unknown time  No Yes   Sig: Take 1 Puff by inhalation every six (6) hours as needed for Wheezing.    umeclidinium-vilanterol (ANORO ELLIPTA) 62.5-25 mcg/actuation inhaler 4/9/2018 at Unknown time  No Yes   Sig: Take 1 Puff by inhalation daily. Facility-Administered Medications: None       Past Medical History:   Diagnosis Date    Allergic rhinitis     mild    Attention deficit hyperactivity disorder (ADHD) 12/20/2016    BPPV (benign paroxysmal positional vertigo) 12/20/2016    Chronic obstructive pulmonary disease ([address]     Ocular migraine 12/20/2016     No past surgical history on file. Social History     Social History    Marital status:      Spouse name: N/A    Number of children: N/A    Years of education: N/A     Occupational History    Not on file.      Social History Main Topics    Smoking status: Former Smoker     Packs/day: 1.00     Years: 40.00     Types: Cigarettes     Quit date: 3/30/2018    Smokeless tobacco: Never Used      Comment: tried to quit in past, wellbutrin, patches,     Alcohol use Yes      Comment: Very Rare    Drug use: Not on file    Sexual activity: Not on file     Other Topics Concern    Blood Transfusions No    Caffeine Concern No     2-3 c coffee, no soda    Exercise No      active in work, up and down stairs     Social History Narrative    Matty Dixon- Wife        Currently Lluvia Mitchell,  -->  supervisor             Family History   Problem Relation Age of Onset    No Known Problems Mother      shingles    No Known Problems Father      No Known Allergies    Current Facility-Administered Medications   Medication Dose Route Frequency    sodium chloride (NS) flush 5-10 mL  5-10 mL IntraVENous Q8H    sodium chloride (NS) flush 5-10 mL  5-10 mL IntraVENous PRN    albuterol (PROVENTIL VENTOLIN) nebulizer solution 5 mg  5 mg Nebulization NOW    guaiFENesin ER (MUCINEX) tablet 1,200 mg  1,200 mg Oral Q12H    budesonide (PULMICORT) 500 mcg/2 ml nebulizer suspension  500 mcg Nebulization BID RT    levoFLOXacin (LEVAQUIN) 750 mg in D5W IVPB  750 mg IntraVENous Q24H    albuterol-ipratropium (DUO-NEB) 2.5 MG-0.5 MG/3 ML  3 mL Nebulization Q4H RT    loratadine (CLARITIN) tablet 10 mg  10 mg Oral DAILY    methylPREDNISolone (PF) (SOLU-MEDROL) injection 80 mg  80 mg IntraVENous Q6H    sodium chloride (NS) flush 5-10 mL  5-10 mL IntraVENous Q8H    sodium chloride (NS) flush 5-10 mL  5-10 mL IntraVENous PRN    acetaminophen (TYLENOL) tablet 650 mg  650 mg Oral Q4H PRN    HYDROcodone-acetaminophen (NORCO) 5-325 mg per tablet 1 Tab  1 Tab Oral Q6H PRN    morphine injection 2 mg  2 mg IntraVENous Q6H PRN    ondansetron (ZOFRAN) injection 4 mg  4 mg IntraVENous Q8H PRN    heparin (porcine) injection 5,000 Units  5,000 Units SubCUTAneous Q8H    montelukast (SINGULAIR) tablet 10 mg  10 mg Oral QHS         Objective:     Vitals:    04/09/18 0603 04/09/18 0632 04/09/18 0757 04/09/18 0758   BP:  120/62  115/60   Pulse: 89 90  88   Resp: 13 13  11   Temp:    97.8 °F (36.6 °C)   SpO2: 100% 99% 99% 99%   Weight:       Height:           PHYSICAL EXAM     Constitutional:  the patient is well developed and in no acute distress at this point but was earlier  EENMT:  Sclera clear, pupils equal, oral mucosa moist  Respiratory: exp. wheezes  Cardiovascular:  RRR without M,G,R  Gastrointestinal: soft and non-tender; with positive bowel sounds. Musculoskeletal: warm without cyanosis. There is no lower leg edema. Skin:  no jaundice or rashes, no wounds   Neurologic: no gross neuro deficits     Psychiatric:  alert and oriented x 3    CXR:        Recent Labs      04/09/18 0228   WBC  16.7*   HGB  16.1   HCT  47.4   PLT  610*     Recent Labs      04/09/18 0228   NA  141   K  3.6   CL  104   GLU  202*   CO2  27   BUN  20   CREA  0.89   MG  1.9   CA  8.6   TROIQ  <0.04   ALB  3.6   TBILI  0.3   ALT  52   SGOT  35     Recent Labs      04/09/18   0615  04/09/18   0338   PH  7.35  7.29*   PCO2  45  53*   PO2  82  57*   HCO3  24  25     No results for input(s): LCAD, LAC in the last 72 hours.     Assessment:  (Medical Decision Making)     Hospital Problems  Date Reviewed: 12/14/2017          Codes Class Noted POA    Acute respiratory failure with hypoxia and hypercapnia (HCC) ICD-10-CM: J96.01, J96.02  ICD-9-CM: 518.81  4/9/2018 Yes        Tobacco abuse (Chronic) ICD-10-CM: Z72.0  ICD-9-CM: 305.1  4/9/2018 Yes        H/O seasonal allergies (Chronic) ICD-10-CM: Z88.9  ICD-9-CM: V15.09  4/9/2018 Yes        COPD exacerbation (HCC) ICD-10-CM: J44.1  ICD-9-CM: 491.21  4/9/2018 Unknown              Plan:  (Medical Decision Making)   1   Iv steroids  2   Smoking cessation  3   Iv antib  4   Bronchodilators  5   antibx-   --    More than 50% of the time documented was spent in face-to-face contact with the patient and in the care of the patient on the floor/unit where the patient is located. Thank you very much for this referral.  We appreciate the opportunity to participate in this patient's care. Will follow along with above stated plan.     Delio Reeves MD

## 2018-04-09 NOTE — ED NOTES
PT continues work of breathing with NC @5L. Pt continues with accessory muscles, nasal flaring and tripod position in chair. VSS 02 sat %94 at this time. Admitting notified of same.

## 2018-04-09 NOTE — PROGRESS NOTES
TRANSFER - IN REPORT:    Verbal report received from Megha KENNY(name) on Ruperto Filter  being received from ER(unit) for urgent transfer      Report consisted of patients Situation, Background, Assessment and   Recommendations(SBAR). Information from the following report(s) ED Summary was reviewed with the receiving nurse. Opportunity for questions and clarification was provided. Assessment completed upon patients arrival to unit and care assumed. Report was given to [facility] in ICU and then to me.

## 2018-04-09 NOTE — PROGRESS NOTES
Shift assessment complete. A+OX4. RR even, labored with exertion. HR regular. LS course wheeze. Abd soft active bowel sounds. Positive pedal pulses. Bed LL. All needs met at this time. Staff will monitor with hourly rounds.

## 2018-04-09 NOTE — ED NOTES
Pt report called to [facility], verbalized complete understanding of pt report and current condition denies questions at this time. PT resting on stretcher, VSS NAD IV intact no RED. Pt remains on NC @ 5L. Pt continues to tripod with nasal flaring. Pt awaiting RN transport to clean ready room.

## 2018-04-09 NOTE — ED TRIAGE NOTES
Patient arrives with shortness of breath. Patient quit smoking 10-11 days ago. Patient recently diagnosed with COPD. Patient audibly gasping for air. Respiratory called immediately upon patient's arrival to department.

## 2018-04-09 NOTE — PROGRESS NOTES
Pt. Placed on heated high flow cannula  In lieu of bipap per Dr. Megan Mcneil. 40 L 40%. spO2  97%. Pt. Alert and appropriate.

## 2018-04-09 NOTE — PROGRESS NOTES
Bedside report received from [facility]. Pt is resting quietly in bed. Alert and oriented x4. Lung sounds have expiratory/inspiratory and scattered wheezing. Currently on optiflow 40L and 40%. Pulses palpable and present all extremities. Abdomen flat, soft, and intact with active bowel sounds. Pt voids with urinal. No complaints of pain. VSS. Will continue to monitor.

## 2018-04-09 NOTE — PROGRESS NOTES
COPD education complete. Patient shown videos, given booklets and information. Purse lip and diaphragmatic breathing demonstrated to patient- Patient understood and demonstrated well. Patient verbalized understanding of disease process. All questions answered.

## 2018-04-09 NOTE — INTERDISCIPLINARY ROUNDS
Interdisciplinary team rounds were held 4/9/2018 with the following team members:Care Management, Nursing, Patient Relations and Physician and the patient. Plan of care discussed. See clinical pathway and/or care plan for interventions and desired outcomes.

## 2018-04-09 NOTE — ED NOTES
Pt continues work of breathing following initial breathing treatment. RT at bedside to admin continues treatment. Dr. Lola Guthrie to bedside. Pt sitting upright on stretcher wearing non re breather mask.

## 2018-04-10 VITALS
BODY MASS INDEX: 20.92 KG/M2 | WEIGHT: 138 LBS | SYSTOLIC BLOOD PRESSURE: 117 MMHG | OXYGEN SATURATION: 96 % | HEART RATE: 106 BPM | DIASTOLIC BLOOD PRESSURE: 55 MMHG | TEMPERATURE: 96.5 F | HEIGHT: 68 IN | RESPIRATION RATE: 16 BRPM

## 2018-04-10 LAB
ALBUMIN SERPL-MCNC: 3.4 G/DL (ref 3.2–4.6)
ALBUMIN/GLOB SERPL: 0.8 {RATIO} (ref 1.2–3.5)
ALP SERPL-CCNC: 75 U/L (ref 50–136)
ALT SERPL-CCNC: 47 U/L (ref 12–65)
ANION GAP SERPL CALC-SCNC: 9 MMOL/L (ref 7–16)
AST SERPL-CCNC: 35 U/L (ref 15–37)
BASOPHILS # BLD: 0 K/UL (ref 0–0.2)
BASOPHILS NFR BLD: 0 % (ref 0–2)
BILIRUB SERPL-MCNC: 0.3 MG/DL (ref 0.2–1.1)
BUN SERPL-MCNC: 20 MG/DL (ref 8–23)
CALCIUM SERPL-MCNC: 8.7 MG/DL (ref 8.3–10.4)
CHLORIDE SERPL-SCNC: 101 MMOL/L (ref 98–107)
CO2 SERPL-SCNC: 28 MMOL/L (ref 21–32)
CREAT SERPL-MCNC: 0.96 MG/DL (ref 0.8–1.5)
DIFFERENTIAL METHOD BLD: ABNORMAL
EOSINOPHIL # BLD: 0 K/UL (ref 0–0.8)
EOSINOPHIL NFR BLD: 0 % (ref 0.5–7.8)
ERYTHROCYTE [DISTWIDTH] IN BLOOD BY AUTOMATED COUNT: 15.2 % (ref 11.9–14.6)
GLOBULIN SER CALC-MCNC: 4.1 G/DL (ref 2.3–3.5)
GLUCOSE SERPL-MCNC: 161 MG/DL (ref 65–100)
HCT VFR BLD AUTO: 40 % (ref 41.1–50.3)
HGB BLD-MCNC: 13.6 G/DL (ref 13.6–17.2)
IMM GRANULOCYTES # BLD: 0.1 K/UL (ref 0–0.5)
IMM GRANULOCYTES NFR BLD AUTO: 0 % (ref 0–5)
LYMPHOCYTES # BLD: 1 K/UL (ref 0.5–4.6)
LYMPHOCYTES NFR BLD: 4 % (ref 13–44)
MAGNESIUM SERPL-MCNC: 2.2 MG/DL (ref 1.8–2.4)
MCH RBC QN AUTO: 29.6 PG (ref 26.1–32.9)
MCHC RBC AUTO-ENTMCNC: 34 G/DL (ref 31.4–35)
MCV RBC AUTO: 87.1 FL (ref 79.6–97.8)
MONOCYTES # BLD: 1.2 K/UL (ref 0.1–1.3)
MONOCYTES NFR BLD: 5 % (ref 4–12)
NEUTS SEG # BLD: 20.7 K/UL (ref 1.7–8.2)
NEUTS SEG NFR BLD: 91 % (ref 43–78)
PLATELET # BLD AUTO: 473 K/UL (ref 150–450)
PMV BLD AUTO: 9.7 FL (ref 10.8–14.1)
POTASSIUM SERPL-SCNC: 3.8 MMOL/L (ref 3.5–5.1)
PROT SERPL-MCNC: 7.5 G/DL (ref 6.3–8.2)
RBC # BLD AUTO: 4.59 M/UL (ref 4.23–5.67)
SODIUM SERPL-SCNC: 138 MMOL/L (ref 136–145)
WBC # BLD AUTO: 22.9 K/UL (ref 4.3–11.1)

## 2018-04-10 PROCEDURE — 83735 ASSAY OF MAGNESIUM: CPT | Performed by: HOSPITALIST

## 2018-04-10 PROCEDURE — 94760 N-INVAS EAR/PLS OXIMETRY 1: CPT

## 2018-04-10 PROCEDURE — 74011250637 HC RX REV CODE- 250/637: Performed by: HOSPITALIST

## 2018-04-10 PROCEDURE — 36415 COLL VENOUS BLD VENIPUNCTURE: CPT | Performed by: HOSPITALIST

## 2018-04-10 PROCEDURE — 80053 COMPREHEN METABOLIC PANEL: CPT | Performed by: HOSPITALIST

## 2018-04-10 PROCEDURE — 74011000250 HC RX REV CODE- 250: Performed by: HOSPITALIST

## 2018-04-10 PROCEDURE — 85025 COMPLETE CBC W/AUTO DIFF WBC: CPT | Performed by: HOSPITALIST

## 2018-04-10 PROCEDURE — 94640 AIRWAY INHALATION TREATMENT: CPT

## 2018-04-10 PROCEDURE — 99232 SBSQ HOSP IP/OBS MODERATE 35: CPT | Performed by: INTERNAL MEDICINE

## 2018-04-10 PROCEDURE — 74011250636 HC RX REV CODE- 250/636: Performed by: HOSPITALIST

## 2018-04-10 RX ORDER — IPRATROPIUM BROMIDE AND ALBUTEROL SULFATE 2.5; .5 MG/3ML; MG/3ML
3 SOLUTION RESPIRATORY (INHALATION)
Status: DISCONTINUED | OUTPATIENT
Start: 2018-04-10 | End: 2018-04-10 | Stop reason: HOSPADM

## 2018-04-10 RX ORDER — PREDNISONE 20 MG/1
40 TABLET ORAL
Status: DISCONTINUED | OUTPATIENT
Start: 2018-04-11 | End: 2018-04-10 | Stop reason: HOSPADM

## 2018-04-10 RX ORDER — MONTELUKAST SODIUM 10 MG/1
10 TABLET ORAL
Qty: 20 TAB | Refills: 0 | Status: SHIPPED | OUTPATIENT
Start: 2018-04-10 | End: 2018-04-20 | Stop reason: SDUPTHER

## 2018-04-10 RX ORDER — LEVOFLOXACIN 500 MG/1
500 TABLET, FILM COATED ORAL EVERY 24 HOURS
Status: DISCONTINUED | OUTPATIENT
Start: 2018-04-11 | End: 2018-04-10 | Stop reason: HOSPADM

## 2018-04-10 RX ORDER — PREDNISONE 20 MG/1
40 TABLET ORAL
Qty: 4 TAB | Refills: 0 | Status: SHIPPED | OUTPATIENT
Start: 2018-04-11 | End: 2018-04-13

## 2018-04-10 RX ORDER — LEVOFLOXACIN 500 MG/1
500 TABLET, FILM COATED ORAL EVERY 24 HOURS
Qty: 2 TAB | Refills: 0 | Status: SHIPPED | OUTPATIENT
Start: 2018-04-11 | End: 2018-04-13

## 2018-04-10 RX ADMIN — IPRATROPIUM BROMIDE AND ALBUTEROL SULFATE 3 ML: 2.5; .5 SOLUTION RESPIRATORY (INHALATION) at 00:35

## 2018-04-10 RX ADMIN — METHYLPREDNISOLONE SODIUM SUCCINATE 80 MG: 125 INJECTION, POWDER, FOR SOLUTION INTRAMUSCULAR; INTRAVENOUS at 08:20

## 2018-04-10 RX ADMIN — HEPARIN SODIUM 5000 UNITS: 5000 INJECTION, SOLUTION INTRAVENOUS; SUBCUTANEOUS at 05:31

## 2018-04-10 RX ADMIN — LEVOFLOXACIN 750 MG: 5 INJECTION, SOLUTION INTRAVENOUS at 05:24

## 2018-04-10 RX ADMIN — GUAIFENESIN 1200 MG: 600 TABLET, EXTENDED RELEASE ORAL at 08:20

## 2018-04-10 RX ADMIN — BUDESONIDE 500 MCG: 0.5 INHALANT RESPIRATORY (INHALATION) at 07:39

## 2018-04-10 RX ADMIN — Medication 5 ML: at 05:34

## 2018-04-10 RX ADMIN — METHYLPREDNISOLONE SODIUM SUCCINATE 80 MG: 125 INJECTION, POWDER, FOR SOLUTION INTRAMUSCULAR; INTRAVENOUS at 03:22

## 2018-04-10 RX ADMIN — LORATADINE 10 MG: 10 TABLET ORAL at 08:20

## 2018-04-10 RX ADMIN — IPRATROPIUM BROMIDE AND ALBUTEROL SULFATE 3 ML: 2.5; .5 SOLUTION RESPIRATORY (INHALATION) at 07:39

## 2018-04-10 NOTE — PROGRESS NOTES
Problem: Falls - Risk of  Goal: *Absence of Falls  Document James Fall Risk and appropriate interventions in the flowsheet.    Outcome: Progressing Towards Goal  Fall Risk Interventions:            Medication Interventions: Bed/chair exit alarm    Elimination Interventions: Bed/chair exit alarm

## 2018-04-10 NOTE — PROGRESS NOTES
Initial visit was made and a spiritual presence was provided to patient and his wife. The patient was in the process of being discharged.        [employer]

## 2018-04-10 NOTE — PROGRESS NOTES
Discharge instructions and prescriptions provided and explained to the pt and his wife. Med side effect sheet reviewed. Opportunity for questions provided. Pt is ready to leave, I walked them downstairs.

## 2018-04-10 NOTE — PROGRESS NOTES
PT/OT Note: S: Pt states he is feeling much better. O: Pt and wife standing at doorway to room. Pt ambulated 250' with supervision-independence. Pt ascends/descends a flight of steps with supervision-independence. A: Pt agreeable to ambulate. Pt states he is better and would like to go home ASAP. P: No skilled PT/OT needs identified.

## 2018-04-10 NOTE — DISCHARGE SUMMARY
Hospitalist Discharge Summary     Admit Date:  2018  2:13 AM   Name:  Patrick [place]   Age:  58 y.o.  :  1955   MRN:  [id number]   PCP:  Mike Rodrigez MD  Treatment Team: Attending Provider: Jeovany Neal MD; Consulting Provider: Kaylen Dinh MD    Problem List for this Hospitalization:  Hospital Problems as of 4/10/2018  Date Reviewed: 2017          Codes Class Noted - Resolved POA    Acute respiratory failure with hypoxia and hypercapnia ([facility] ICD-10-CM: J96.01, J96.02  ICD-9-CM: 518.81  2018 - Present Yes        Tobacco abuse (Chronic) ICD-10-CM: Z72.0  ICD-9-CM: 305.1  2018 - Present Yes        H/O seasonal allergies (Chronic) ICD-10-CM: Z88.9  ICD-9-CM: V15.09  2018 - Present Yes        COPD exacerbation ([facility] ICD-10-CM: J44.1  ICD-9-CM: 491.21  2018 - Present Unknown        * (Principal)RESOLVED: COPD (chronic obstructive pulmonary disease) ([facility] ICD-10-CM: J44.9  ICD-9-CM: 885  2018 - 2018 Yes                Admission HPI from 2018:    \" Mr. Rosas Kinsley is a 59 yo M with PMHx of COPD (diagnosed in 2017 - not seen by pulmonary), ADHD who presented to ED with increased SOB and expiratory wheezing for last 10-11 days. Had some nonproductive cough. Denies any spikes of fever. Hx of tobacco abuse, stopped smoking 11 days prior. In found in severe respiratory distress. SaO2 in low 80;'s at presentation. Received Duoneb, IV  mg IV x1 with some improvement, able to talk, although using accessory respiratory muscle, laying at the side of the bed and unable to lay back. increased SOB. Ed work-up showed. WBC:16.7, Trop I<0.04. CXR w/o acute pathology. ABG with pH:7.29, pO2:57, pCO2:53. Wife at bedside. \"    Hospital Course:  Mr. Alison Cantrell is a 59 yo M with PMHx of COPD (diagnosed in 2017 - not seen by pulmonary), ADHD who presented to ED with increased SOB and expiratory wheezing for last 10-11 days.  He was diagnosed with a COPD exacerbation secondary to a viral URI, he was started on DuoNebs, steroids and levofloxacin after which clinical symptoms resolved. He was discharged home on 4/10/2018 with PCP follow up within 1 week and pulm follow up outpatient. Follow up instructions below. Plan was discussed with the patient. All questions answered. Patient was stable at time of discharge and was instructed to call or return if there are any concerns or recurrence of symptoms. Diagnostic Imaging/Tests:   Xr Chest Port    Result Date: 4/9/2018  EXAM:  XR CHEST PORT INDICATION:  cough / hypoxia COMPARISON:  None. FINDINGS: A portable AP radiograph of the chest was obtained at 0234 hours. The patient is on a cardiac monitor. COPD. The lungs are clear. .  The cardiac and mediastinal contours and pulmonary vascularity are normal.  The bones and soft tissues are grossly within normal limits. IMPRESSION: COPD. No acute cardiopulmonary disease. Echocardiogram results:  No results found for this visit on 04/09/18.       All Micro Results     Procedure Component Value Units Date/Time    CULTURE, RESPIRATORY/SPUTUM/BRONCH Adolfo Villaradhapatito [[id number]] Collected:  04/09/18 0725    Order Status:  Completed Specimen:  Sputum from Sputum Updated:  04/10/18 0154     Special Requests: NO SPECIAL REQUESTS        GRAM STAIN MANY GRAM POSITIVE COCCI         FEW GRAM NEGATIVE RODS         0 TO 8 WBC'S PER OIF      0 TO 4 EPITHELIAL CELLS PER OIF      1+ MUCUS     Culture result:         HEAVY NORMAL RESPIRATORY KYUNG          Labs: Results:       BMP, Mg, Phos Recent Labs      04/10/18   0622 04/09/18 0228   NA  138  141   K  3.8  3.6   CL  101  104   CO2  28  27   AGAP  9  10   BUN  20  20   CREA  0.96  0.89   CA  8.7  8.6   GLU  161*  202*   MG  2.2  1.9      CBC Recent Labs      04/10/18   0622  04/09/18   0228   WBC  22.9*  16.7*   RBC  4.59  5.34   HGB  13.6  16.1   HCT  40.0*  47.4   PLT  473*  610*   GRANS  91*  40*   LYMPH  4*  26   EOS  0*  24*   MONOS  5  9   BASOS  0  1 IG  0  0   ANEU  20.7*  6.7   ABL  1.0  4.3   CARLOS  0.0  4.0*   ABM  1.2  1.4*   ABB  0.0  0.2   AIG  0.1  0.1      LFT Recent Labs      04/10/18   0622  04/09/18   0228   SGOT  35  35   ALT  47  52   AP  75  91   TP  7.5  8.2   ALB  3.4  3.6   GLOB  4.1*  4.6*   AGRAT  0.8*  0.8*      Cardiac Testing Lab Results   Component Value Date/Time    Troponin-I, Qt. <0.04 04/09/2018 02:28 AM      Coagulation Tests No results found for: PTP, INR, APTT   A1c Lab Results   Component Value Date/Time    Hemoglobin A1c 5.6 07/12/2017 10:54 AM    Hemoglobin A1c 6.0 (H) 12/13/2016 10:54 AM      Lipid Panel Lab Results   Component Value Date/Time    Cholesterol, total 183 12/05/2017 08:50 AM    HDL Cholesterol 47 12/05/2017 08:50 AM    LDL, calculated 120 (H) 12/05/2017 08:50 AM    VLDL, calculated 16 12/05/2017 08:50 AM    Triglyceride 82 12/05/2017 08:50 AM      Thyroid Panel Lab Results   Component Value Date/Time    TSH 1.430 12/05/2017 08:50 AM    TSH 1.430 12/13/2016 10:54 AM        Most Recent UA Lab Results   Component Value Date/Time    Color Yellow 12/05/2017 08:43 AM    Appearance Clear 12/05/2017 08:43 AM    pH (UA) 8.5 12/05/2017 08:43 AM    Protein Negative 12/05/2017 08:43 AM    Glucose Negative 12/05/2017 08:43 AM    Ketone Negative 12/05/2017 08:43 AM    Bilirubin Negative 12/05/2017 08:43 AM    Blood Negative 12/05/2017 08:43 AM    Urobilinogen 0.2 E.U./dL 12/05/2017 08:43 AM    Nitrites Negative 12/05/2017 08:43 AM    Leukocyte Esterase Negative 12/05/2017 08:43 AM        No Known Allergies  Immunization History   Administered Date(s) Administered    Influenza Vaccine (Quad) PF 09/11/2015, 11/12/2016, 10/07/2017    Pneumococcal Polysaccharide (PPSV-23) 08/14/2017       All Labs from Last 24 Hrs:  Recent Results (from the past 24 hour(s))   CBC WITH AUTOMATED DIFF    Collection Time: 04/10/18  6:22 AM   Result Value Ref Range    WBC 22.9 (H) 4.3 - 11.1 K/uL    RBC 4.59 4.23 - 5.67 M/uL    HGB 13.6 13.6 - 17.2 g/dL    HCT 40.0 (L) 41.1 - 50.3 %    MCV 87.1 79.6 - 97.8 FL    MCH 29.6 26.1 - 32.9 PG    MCHC 34.0 31.4 - 35.0 g/dL    RDW 15.2 (H) 11.9 - 14.6 %    PLATELET 218 (H) 134 - 450 K/uL    MPV 9.7 (L) 10.8 - 14.1 FL    DF AUTOMATED      NEUTROPHILS 91 (H) 43 - 78 %    LYMPHOCYTES 4 (L) 13 - 44 %    MONOCYTES 5 4.0 - 12.0 %    EOSINOPHILS 0 (L) 0.5 - 7.8 %    BASOPHILS 0 0.0 - 2.0 %    IMMATURE GRANULOCYTES 0 0.0 - 5.0 %    ABS. NEUTROPHILS 20.7 (H) 1.7 - 8.2 K/UL    ABS. LYMPHOCYTES 1.0 0.5 - 4.6 K/UL    ABS. MONOCYTES 1.2 0.1 - 1.3 K/UL    ABS. EOSINOPHILS 0.0 0.0 - 0.8 K/UL    ABS. BASOPHILS 0.0 0.0 - 0.2 K/UL    ABS. IMM. GRANS. 0.1 0.0 - 0.5 K/UL   METABOLIC PANEL, COMPREHENSIVE    Collection Time: 04/10/18  6:22 AM   Result Value Ref Range    Sodium 138 136 - 145 mmol/L    Potassium 3.8 3.5 - 5.1 mmol/L    Chloride 101 98 - 107 mmol/L    CO2 28 21 - 32 mmol/L    Anion gap 9 7 - 16 mmol/L    Glucose 161 (H) 65 - 100 mg/dL    BUN 20 8 - 23 MG/DL    Creatinine 0.96 0.8 - 1.5 MG/DL    GFR est AA >60 >60 ml/min/1.73m2    GFR est non-AA >60 >60 ml/min/1.73m2    Calcium 8.7 8.3 - 10.4 MG/DL    Bilirubin, total 0.3 0.2 - 1.1 MG/DL    ALT (SGPT) 47 12 - 65 U/L    AST (SGOT) 35 15 - 37 U/L    Alk.  phosphatase 75 50 - 136 U/L    Protein, total 7.5 6.3 - 8.2 g/dL    Albumin 3.4 3.2 - 4.6 g/dL    Globulin 4.1 (H) 2.3 - 3.5 g/dL    A-G Ratio 0.8 (L) 1.2 - 3.5     MAGNESIUM    Collection Time: 04/10/18  6:22 AM   Result Value Ref Range    Magnesium 2.2 1.8 - 2.4 mg/dL       Discharge Exam:  Patient Vitals for the past 24 hrs:   Temp Pulse Resp BP SpO2   04/10/18 0754 96.5 °F (35.8 °C) (!) 106 16 117/55 96 %   04/10/18 0739 - - - - 94 %   04/10/18 0405 98 °F (36.7 °C) 92 16 116/62 93 %   04/10/18 0035 - - - - 95 %   04/10/18 0006 98 °F (36.7 °C) 88 18 101/54 92 %   04/09/18 2049 - - - - 95 %   04/09/18 1818 98.1 °F (36.7 °C) 97 22 107/61 94 %   04/09/18 1545 - - - - 97 %   04/09/18 1542 - - - - 97 %   04/09/18 1505 97.9 °F (36.6 °C) 92 22 127/56 95 %   04/09/18 1116 - - - - 98 %   04/09/18 1047 97.8 °F (36.6 °C) 97 28 128/71 94 %     Oxygen Therapy  O2 Sat (%): 96 % (04/10/18 0754)  Pulse via Oximetry: 94 beats per minute (04/10/18 0739)  O2 Device: Room air (04/10/18 0739)  O2 Flow Rate (L/min): 0 l/min (04/10/18 0739)  O2 Temperature: 87.8 °F (31 °C) (04/09/18 0528)  FIO2 (%): 21 % (04/09/18 2049)    Intake/Output Summary (Last 24 hours) at 04/10/18 1012  Last data filed at 04/09/18 1030   Gross per 24 hour   Intake                0 ml   Output              500 ml   Net             -500 ml       General:    Well nourished. Alert. No distress. Eyes:   Normal sclera. Extraocular movements intact. ENT:  Normocephalic, atraumatic. Moist mucous membranes  CV:   Regular rate and rhythm. No murmur, rub, or gallop. Lungs:  Clear to auscultation bilaterally. No wheezing, rhonchi, or rales. Abdomen: Soft, nontender, nondistended. Bowel sounds normal.   Extremities: Warm and dry. No cyanosis or edema. Neurologic: CN II-XII grossly intact. Sensation intact. Skin:     No rashes or jaundice. Psych:  Normal mood and affect. Discharge Info:   Current Discharge Medication List      START taking these medications    Details   levoFLOXacin (LEVAQUIN) 500 mg tablet Take 1 Tab by mouth every twenty-four (24) hours for 2 days. Qty: 2 Tab, Refills: 0      predniSONE (DELTASONE) 20 mg tablet Take 2 Tabs by mouth daily (with breakfast) for 2 days. Qty: 4 Tab, Refills: 0      montelukast (SINGULAIR) 10 mg tablet Take 1 Tab by mouth nightly. Qty: 20 Tab, Refills: 0         CONTINUE these medications which have NOT CHANGED    Details   umeclidinium-vilanterol (ANORO ELLIPTA) 62.5-25 mcg/actuation inhaler Take 1 Puff by inhalation daily.   Qty: 1 Inhaler, Refills: 11    Associated Diagnoses: Chronic obstructive pulmonary disease, unspecified COPD type (HCC)      ipratropium-albuterol (COMBIVENT RESPIMAT)  mcg/actuation inhaler Take 1 Puff by inhalation every six (6) hours as needed for Wheezing. Qty: 1 Inhaler, Refills: 11    Associated Diagnoses: Chronic obstructive pulmonary disease, unspecified COPD type ([address]               Disposition: home    Activity: Activity as tolerated  Diet: DIET REGULAR    Follow-up Information     Follow up With Details Comments Contact Info    Cale Gomez MD In 1 month APPT. MAY 10th @ 9:30 WILL SEE  [facility] and [facility]      Ahn Gann MD On 4/17/2018 9:45AM [address] [place] [address] Rd  [phone]              Time spent in patient discharge planning and coordination 45 minutes.     Signed:  Tato Lester MD

## 2018-04-10 NOTE — PROGRESS NOTES
Jamee Rich  Admission Date: 4/9/2018             Daily Progress Note: 4/10/2018    The patient's chart is reviewed and the patient is discussed with the staff.     Subjective:     59 y/o admitted with copd exacerbation- now much better off o2    Current Facility-Administered Medications   Medication Dose Route Frequency    albuterol-ipratropium (DUO-NEB) 2.5 MG-0.5 MG/3 ML  3 mL Nebulization Q4H PRN    sodium chloride (NS) flush 5-10 mL  5-10 mL IntraVENous Q8H    sodium chloride (NS) flush 5-10 mL  5-10 mL IntraVENous PRN    guaiFENesin ER (MUCINEX) tablet 1,200 mg  1,200 mg Oral Q12H    budesonide (PULMICORT) 500 mcg/2 ml nebulizer suspension  500 mcg Nebulization BID RT    levoFLOXacin (LEVAQUIN) 750 mg in D5W IVPB  750 mg IntraVENous Q24H    loratadine (CLARITIN) tablet 10 mg  10 mg Oral DAILY    methylPREDNISolone (PF) (SOLU-MEDROL) injection 80 mg  80 mg IntraVENous Q6H    sodium chloride (NS) flush 5-10 mL  5-10 mL IntraVENous Q8H    sodium chloride (NS) flush 5-10 mL  5-10 mL IntraVENous PRN    acetaminophen (TYLENOL) tablet 650 mg  650 mg Oral Q4H PRN    HYDROcodone-acetaminophen (NORCO) 5-325 mg per tablet 1 Tab  1 Tab Oral Q6H PRN    morphine injection 2 mg  2 mg IntraVENous Q6H PRN    ondansetron (ZOFRAN) injection 4 mg  4 mg IntraVENous Q8H PRN    heparin (porcine) injection 5,000 Units  5,000 Units SubCUTAneous Q8H    montelukast (SINGULAIR) tablet 10 mg  10 mg Oral QHS    phenol throat spray (CHLORASEPTIC) 1 Spray  1 Spray Oral PRN       Review of Systems    Constitutional: negative for fever, chills, sweats  Cardiovascular: negative for chest pain, palpitations, syncope, edema  Gastrointestinal:  negative for dysphagia, reflux, vomiting, diarrhea, abdominal pain, or melena  Neurologic:  negative for focal weakness, numbness, headache    Objective:     Vitals:    04/10/18 0035 04/10/18 0405 04/10/18 0739 04/10/18 0754   BP:  116/62  117/55   Pulse:  92  (!) 106 Resp:  16  16   Temp:  98 °F (36.7 °C)  96.5 °F (35.8 °C)   SpO2: 95% 93% 94% 96%   Weight:       Height:         Intake and Output:   04/08 1901 - 04/10 0700  In: 490 [P.O.:240; I.V.:250]  Out: 500 [Urine:500]       Physical Exam:   Constitution:  the patient is well developed and in no acute distress  EENMT:  Sclera clear, pupils equal, oral mucosa moist  Respiratory: clear  Cardiovascular:  RRR without M,G,R  Gastrointestinal: soft and non-tender; with positive bowel sounds. Musculoskeletal: warm without cyanosis. There is no lower leg edema. Skin:  no jaundice or rashes, no wounds   Neurologic: no gross neuro deficits     Psychiatric:  alert and oriented x 3    CXR:       LAB  No results for input(s): GLUCPOC in the last 72 hours. No lab exists for component: [place]   Recent Labs      04/10/18   0622  04/09/18   0228   WBC  22.9*  16.7*   HGB  13.6  16.1   HCT  40.0*  47.4   PLT  473*  610*     Recent Labs      04/10/18   0622  04/09/18   0228   NA  138  141   K  3.8  3.6   CL  101  104   CO2  28  27   GLU  161*  202*   BUN  20  20   CREA  0.96  0.89   MG  2.2  1.9   CA  8.7  8.6   TROIQ   --   <0.04   ALB  3.4  3.6   TBILI  0.3  0.3   ALT  47  52   SGOT  35  35     Recent Labs      04/09/18   0615  04/09/18   0338   PH  7.35  7.29*   PCO2  45  53*   PO2  82  57*   HCO3  24  25     No results for input(s): LCAD, LAC in the last 72 hours.       Assessment:  (Medical Decision Making)     Hospital Problems  Date Reviewed: 12/14/2017          Codes Class Noted POA    Acute respiratory failure with hypoxia and hypercapnia (HCC) ICD-10-CM: J96.01, J96.02  ICD-9-CM: 518.81  4/9/2018 Yes    Off o2    Tobacco abuse (Chronic) ICD-10-CM: Z72.0  ICD-9-CM: 305.1  4/9/2018 Yes        H/O seasonal allergies (Chronic) ICD-10-CM: Z88.9  ICD-9-CM: V15.09  4/9/2018 Yes        COPD exacerbation ([facility] ICD-10-CM: J44.1  ICD-9-CM: 491.21  4/9/2018 Unknown    better          Plan:  (Medical Decision Making)   1   Will follow up in 1 month- ok for d/c  --    More than 50% of the time documented was spent in face-to-face contact with the patient and in the care of the patient on the floor/unit where the patient is located.     Marce Bautista MD

## 2018-04-10 NOTE — PROGRESS NOTES
Hospitalist Progress Note     Admit Date:  2018  2:13 AM   Name:  Jamee Rich   Age:  58 y.o.  :  1955   MRN:  [id number]   PCP:  Sheeba Murillo MD  Treatment Team: Attending Provider: Halie Knott MD; Consulting Provider: Crow Landa MD    Subjective:     Fatimah Aaron is a 59 yo M with PMHx of COPD (diagnosed in 2017 - not seen by pulmonary), ADHD who presented to ED with increased SOB and expiratory wheezing for last 10-11 days. She was diagnosed with COPD exacerbation secondary to viral URI and started on DuoNebs Q4H, steroids and levofloxacin. This morning, he reports complete resolution in dyspnea. He ambulated well on RA with PT/OT this morning.     Objective:   Patient Vitals for the past 24 hrs:   Temp Pulse Resp BP SpO2   04/10/18 0754 96.5 °F (35.8 °C) (!) 106 16 117/55 96 %   04/10/18 0739 - - - - 94 %   04/10/18 0405 98 °F (36.7 °C) 92 16 116/62 93 %   04/10/18 0035 - - - - 95 %   04/10/18 0006 98 °F (36.7 °C) 88 18 101/54 92 %   18 2049 - - - - 95 %   18 1818 98.1 °F (36.7 °C) 97 22 107/61 94 %   18 1545 - - - - 97 %   18 1542 - - - - 97 %   18 1505 97.9 °F (36.6 °C) 92 22 127/56 95 %   18 1116 - - - - 98 %   18 1047 97.8 °F (36.6 °C) 97 28 128/71 94 %     Oxygen Therapy  O2 Sat (%): 96 % (04/10/18 0754)  Pulse via Oximetry: 94 beats per minute (04/10/18 0739)  O2 Device: Room air (04/10/18 0739)  O2 Flow Rate (L/min): 0 l/min (04/10/18 0739)  O2 Temperature: 87.8 °F (31 °C) (18 0528)  FIO2 (%): 21 % (18)    Intake/Output Summary (Last 24 hours) at 04/10/18 1008  Last data filed at 18 1030   Gross per 24 hour   Intake                0 ml   Output              500 ml   Net             -500 ml           General appearance: NAD, conversant  Eyes: anicteric sclerae, moist conjunctivae; no lid-lag  ENT: Atraumatic; oropharynx clear with moist mucous membranes and no mucosal ulcerations; normal hard and soft palate  Neck: Trachea midline   FROM, supple, no thyromegaly or lymphadenopathy  Lungs: CTA, with normal respiratory effort and no intercostal retractions  CV: S1,S2 present, no added murmurs  Abdomen: Soft, non-tender; no masses   Extremities: No peripheral edema or extremity lymphadenopathy  Skin: Normal temperature, turgor and texture; no subcutaneous nodules  Psych: Appropriate affect, alert and oriented to person, place and time    Data Review:  I have reviewed all labs, meds, telemetry events, and studies from the last 24 hours. Recent Results (from the past 24 hour(s))   CBC WITH AUTOMATED DIFF    Collection Time: 04/10/18  6:22 AM   Result Value Ref Range    WBC 22.9 (H) 4.3 - 11.1 K/uL    RBC 4.59 4.23 - 5.67 M/uL    HGB 13.6 13.6 - 17.2 g/dL    HCT 40.0 (L) 41.1 - 50.3 %    MCV 87.1 79.6 - 97.8 FL    MCH 29.6 26.1 - 32.9 PG    MCHC 34.0 31.4 - 35.0 g/dL    RDW 15.2 (H) 11.9 - 14.6 %    PLATELET 623 (H) 141 - 450 K/uL    MPV 9.7 (L) 10.8 - 14.1 FL    DF AUTOMATED      NEUTROPHILS 91 (H) 43 - 78 %    LYMPHOCYTES 4 (L) 13 - 44 %    MONOCYTES 5 4.0 - 12.0 %    EOSINOPHILS 0 (L) 0.5 - 7.8 %    BASOPHILS 0 0.0 - 2.0 %    IMMATURE GRANULOCYTES 0 0.0 - 5.0 %    ABS. NEUTROPHILS 20.7 (H) 1.7 - 8.2 K/UL    ABS. LYMPHOCYTES 1.0 0.5 - 4.6 K/UL    ABS. MONOCYTES 1.2 0.1 - 1.3 K/UL    ABS. EOSINOPHILS 0.0 0.0 - 0.8 K/UL    ABS. BASOPHILS 0.0 0.0 - 0.2 K/UL    ABS. IMM.  GRANS. 0.1 0.0 - 0.5 K/UL   METABOLIC PANEL, COMPREHENSIVE    Collection Time: 04/10/18  6:22 AM   Result Value Ref Range    Sodium 138 136 - 145 mmol/L    Potassium 3.8 3.5 - 5.1 mmol/L    Chloride 101 98 - 107 mmol/L    CO2 28 21 - 32 mmol/L    Anion gap 9 7 - 16 mmol/L    Glucose 161 (H) 65 - 100 mg/dL    BUN 20 8 - 23 MG/DL    Creatinine 0.96 0.8 - 1.5 MG/DL    GFR est AA >60 >60 ml/min/1.73m2    GFR est non-AA >60 >60 ml/min/1.73m2    Calcium 8.7 8.3 - 10.4 MG/DL    Bilirubin, total 0.3 0.2 - 1.1 MG/DL    ALT (SGPT) 47 12 - 65 U/L    AST (SGOT) 35 15 - 37 U/L Alk. phosphatase 75 50 - 136 U/L    Protein, total 7.5 6.3 - 8.2 g/dL    Albumin 3.4 3.2 - 4.6 g/dL    Globulin 4.1 (H) 2.3 - 3.5 g/dL    A-G Ratio 0.8 (L) 1.2 - 3.5     MAGNESIUM    Collection Time: 04/10/18  6:22 AM   Result Value Ref Range    Magnesium 2.2 1.8 - 2.4 mg/dL        All Micro Results     Procedure Component Value Units Date/Time    CULTURE, RESPIRATORY/SPUTUM/BRONCH Sasser Katty STAIN [[id number]] Collected:  04/09/18 0725    Order Status:  Completed Specimen:  Sputum from Sputum Updated:  04/10/18 6126     Special Requests: NO SPECIAL REQUESTS        GRAM STAIN MANY GRAM POSITIVE COCCI         FEW GRAM NEGATIVE RODS         0 TO 8 WBC'S PER OIF      0 TO 4 EPITHELIAL CELLS PER OIF      1+ MUCUS     Culture result:         HEAVY NORMAL RESPIRATORY KYUNG          Current Meds:  Current Facility-Administered Medications   Medication Dose Route Frequency    albuterol-ipratropium (DUO-NEB) 2.5 MG-0.5 MG/3 ML  3 mL Nebulization Q4H PRN    [START ON 4/11/2018] levoFLOXacin (LEVAQUIN) tablet 500 mg  500 mg Oral Q24H    [START ON 4/11/2018] predniSONE (DELTASONE) tablet 40 mg  40 mg Oral DAILY WITH BREAKFAST    sodium chloride (NS) flush 5-10 mL  5-10 mL IntraVENous Q8H    sodium chloride (NS) flush 5-10 mL  5-10 mL IntraVENous PRN    guaiFENesin ER (MUCINEX) tablet 1,200 mg  1,200 mg Oral Q12H    budesonide (PULMICORT) 500 mcg/2 ml nebulizer suspension  500 mcg Nebulization BID RT    loratadine (CLARITIN) tablet 10 mg  10 mg Oral DAILY    sodium chloride (NS) flush 5-10 mL  5-10 mL IntraVENous Q8H    sodium chloride (NS) flush 5-10 mL  5-10 mL IntraVENous PRN    acetaminophen (TYLENOL) tablet 650 mg  650 mg Oral Q4H PRN    HYDROcodone-acetaminophen (NORCO) 5-325 mg per tablet 1 Tab  1 Tab Oral Q6H PRN    morphine injection 2 mg  2 mg IntraVENous Q6H PRN    ondansetron (ZOFRAN) injection 4 mg  4 mg IntraVENous Q8H PRN    heparin (porcine) injection 5,000 Units  5,000 Units SubCUTAneous Q8H    montelukast (SINGULAIR) tablet 10 mg  10 mg Oral QHS    phenol throat spray (CHLORASEPTIC) 1 Spray  1 Spray Oral PRN       Other Studies (last 24 hours):  No results found. Review of Systems:  Gen: No weight loss  Eyes: no vision changes  ENT: no sore throat  Resp: No dyspnea, +cough  CV: No chest pain  Abd:  no abd pain, no vomiting or diarrhea  : No incontinence, no hematuria or dysuria, no flank pain  MSK: no leg swelling  Neuro: No HA or dizziness, no weakness, numbness/tingling    Assessment and Plan:     Hospital Problems as of 4/10/2018  Date Reviewed: 12/14/2017          Codes Class Noted - Resolved POA    Acute respiratory failure with hypoxia and hypercapnia (HCC) ICD-10-CM: J96.01, J96.02  ICD-9-CM: 518.81  4/9/2018 - Present Yes        Tobacco abuse (Chronic) ICD-10-CM: Z72.0  ICD-9-CM: 305.1  4/9/2018 - Present Yes        H/O seasonal allergies (Chronic) ICD-10-CM: Z88.9  ICD-9-CM: V15.09  4/9/2018 - Present Yes        COPD exacerbation ([facility] ICD-10-CM: J44.1  ICD-9-CM: 491.21  4/9/2018 - Present Unknown        * (Principal)RESOLVED: COPD (chronic obstructive pulmonary disease) ([facility] ICD-10-CM: J44.9  ICD-9-CM: 496  4/9/2018 - 4/9/2018 Yes              PLAN:    Acute COPD exacerbation secondary to viral URI and started on DuoNebs Q4H, steroids and levofloxacin.  Clinically improved  Plan  Discharge on prednisone day 3/5, levofloxacin day 3/5  Started on montelukast  PCP follow up within 1 week    DC planning/Dispo:  Discharge home today  DVT ppx: heparin     Signed:  Terrence Garrett MD

## 2018-04-10 NOTE — PROGRESS NOTES
Assessment done via doc flow sheet. Pt up ad lisa in room, alert & oriented x4, resp regular, lung sounds diminished, O2 sat 96% room air. Denies pain. Bed low & locked, side rails x3 up with call light within reach, pt instructed to call for assistance as needed.

## 2018-04-10 NOTE — DISCHARGE INSTRUCTIONS
DISCHARGE SUMMARY from Nurse    The following personal items are in your possession at time of discharge:    Dental Appliances: At home, Lowers, Uppers  Visual Aid: At bedside, Glasses     Home Medications: None  Jewelry: None  Clothing: Jacket/Coat, Pants, Socks  Other Valuables: None  Personal Items Sent to Safe: none          PATIENT INSTRUCTIONS:    After general anesthesia or intravenous sedation, for 24 hours or while taking prescription Narcotics:  · Limit your activities  · Do not drive and operate hazardous machinery  · Do not make important personal or business decisions  · Do  not drink alcoholic beverages  · If you have not urinated within 8 hours after discharge, please contact your surgeon on call. Report the following to your surgeon:  · Excessive pain, swelling, redness or odor of or around the surgical area  · Temperature over 100.5  · Nausea and vomiting lasting longer than 4 hours or if unable to take medications  · Any signs of decreased circulation or nerve impairment to extremity: change in color, persistent  numbness, tingling, coldness or increase pain  · Any questions        What to do at Home:  Recommended activity: Activity as tolerated, per MD    If you experience any of the following symptoms fever>101, pain unrelieved with medication, nausea/vomiting, shortness of breath, dizziness/fainting, chest pain. , please follow up with your doctor. *  Please give a list of your current medications to your Primary Care Provider. *  Please update this list whenever your medications are discontinued, doses are      changed, or new medications (including over-the-counter products) are added. *  Please carry medication information at all times in case of emergency situations.           These are general instructions for a healthy lifestyle:    No smoking/ No tobacco products/ Avoid exposure to second hand smoke    Surgeon General's Warning:  Quitting smoking now greatly reduces serious risk to your health. Obesity, smoking, and sedentary lifestyle greatly increases your risk for illness    A healthy diet, regular physical exercise & weight monitoring are important for maintaining a healthy lifestyle    You may be retaining fluid if you have a history of heart failure or if you experience any of the following symptoms:  Weight gain of 3 pounds or more overnight or 5 pounds in a week, increased swelling in our hands or feet or shortness of breath while lying flat in bed. Please call your doctor as soon as you notice any of these symptoms; do not wait until your next office visit. Recognize signs and symptoms of STROKE:    F-face looks uneven    A-arms unable to move or move unevenly    S-speech slurred or non-existent    T-time-call 911 as soon as signs and symptoms begin-DO NOT go       Back to bed or wait to see if you get better-TIME IS BRAIN. Warning Signs of HEART ATTACK     Call 911 if you have these symptoms:   Chest discomfort. Most heart attacks involve discomfort in the center of the chest that lasts more than a few minutes, or that goes away and comes back. It can feel like uncomfortable pressure, squeezing, fullness, or pain.  Discomfort in other areas of the upper body. Symptoms can include pain or discomfort in one or both arms, the back, neck, jaw, or stomach.  Shortness of breath with or without chest discomfort.  Other signs may include breaking out in a cold sweat, nausea, or lightheadedness. Don't wait more than five minutes to call 911 - MINUTES MATTER! Fast action can save your life. Calling 911 is almost always the fastest way to get lifesaving treatment. Emergency Medical Services staff can begin treatment when they arrive -- up to an hour sooner than if someone gets to the hospital by car. The discharge information has been reviewed with the patient. The patient verbalized understanding.     Discharge medications reviewed with the patient and appropriate educational materials and side effects teaching were provided. COPD Exacerbation Plan: Care Instructions  Your Care Instructions    If you have chronic obstructive pulmonary disease (COPD), your usual shortness of breath could suddenly get worse. You may start coughing more and have more mucus. This flare-up is called a COPD exacerbation (say \"zl-MGR-ub-BAY-mali\"). A lung infection or air pollution could set off an exacerbation. Sometimes it can happen after a quick change in temperature or being around chemicals. Work with your doctor to make a plan for dealing with an exacerbation. You can better manage it if you plan ahead. Follow-up care is a key part of your treatment and safety. Be sure to make and go to all appointments, and call your doctor if you are having problems. It's also a good idea to know your test results and keep a list of the medicines you take. How can you care for yourself at home? During an exacerbation  · Do not panic if you start to have one. Quick treatment at home may help you prevent serious breathing problems. If you have a COPD exacerbation plan that you developed with your doctor, follow it. · Take your medicines exactly as your doctor tells you. ¨ Use your inhaler as directed by your doctor. If your symptoms do not get better after you use your medicine, have someone take you to the emergency room. Call an ambulance if necessary. ¨ With inhaled medicines, a spacer or a nebulizer may help you get more medicine to your lungs. Ask your doctor or pharmacist how to use them properly. Practice using the spacer in front of a mirror before you have an exacerbation. This may help you get the medicine into your lungs quickly. ¨ If your doctor has given you steroid pills, take them as directed. ¨ Your doctor may have given you a prescription for antibiotics, which you can fill if you need it. ¨ Talk to your doctor if you have any problems with your medicine. And call your doctor if you have to use your antibiotic or steroid pills. Preventing an exacerbation  · Do not smoke. This is the most important step you can take to prevent more damage to your lungs and prevent problems. If you already smoke, it is never too late to stop. If you need help quitting, talk to your doctor about stop-smoking programs and medicines. These can increase your chances of quitting for good. · Take your daily medicines as prescribed. · Avoid colds and flu. ¨ Get a pneumococcal vaccine. ¨ Get a flu vaccine each year, as soon as it is available. Ask those you live or work with to do the same, so they will not get the flu and infect you. ¨ Try to stay away from people with colds or the flu. ¨ Wash your hands often. · Avoid secondhand smoke; air pollution; cold, dry air; hot, humid air; and high altitudes. Stay at home with your windows closed when air pollution is bad. · Learn breathing techniques for COPD, such as breathing through pursed lips. These techniques can help you breathe easier during an exacerbation. When should you call for help? Call 911 anytime you think you may need emergency care. For example, call if:  ? · You have severe trouble breathing. ? · You have severe chest pain. ?Call your doctor now or seek immediate medical care if:  ? · You have new or worse shortness of breath. ? · You develop new chest pain. ? · You are coughing more deeply or more often, especially if you notice more mucus or a change in the color of your mucus. ? · You cough up blood. ? · You have new or increased swelling in your legs or belly. ? · You have a fever. ? Watch closely for changes in your health, and be sure to contact your doctor if:  ? · You need to use your antibiotic or steroid pills. ? · Your symptoms are getting worse. Where can you learn more? Go to http://bay-leonel.info/.   Enter Z599 in the search box to learn more about \"COPD Exacerbation Plan: Care Instructions. \"  Current as of: May 12, 2017  Content Version: 11.4  © 8071-1495 [employer]wise, [employer]. Care instructions adapted under license by [employer] (which disclaims liability or warranty for this information). If you have questions about a medical condition or this instruction, always ask your healthcare professional. [place] [address] any warranty or liability for your use of this information.

## 2018-04-11 LAB
BACTERIA SPEC CULT: NORMAL
GRAM STN SPEC: NORMAL
SERVICE CMNT-IMP: NORMAL

## 2018-05-10 PROBLEM — J44.9 COPD (CHRONIC OBSTRUCTIVE PULMONARY DISEASE) (HCC): Status: ACTIVE | Noted: 2018-05-10

## 2018-05-10 PROBLEM — Z09 HOSPITAL DISCHARGE FOLLOW-UP: Status: ACTIVE | Noted: 2018-05-10

## 2022-03-19 PROBLEM — J44.9 COPD (CHRONIC OBSTRUCTIVE PULMONARY DISEASE) (HCC): Status: ACTIVE | Noted: 2018-05-10

## 2022-03-19 PROBLEM — Z09 HOSPITAL DISCHARGE FOLLOW-UP: Status: ACTIVE | Noted: 2018-05-10

## 2022-03-19 PROBLEM — J96.01 ACUTE RESPIRATORY FAILURE WITH HYPOXIA AND HYPERCAPNIA (HCC): Status: ACTIVE | Noted: 2018-04-09

## 2022-03-19 PROBLEM — Z72.0 TOBACCO ABUSE: Status: ACTIVE | Noted: 2018-04-09

## 2022-03-19 PROBLEM — J44.1 COPD EXACERBATION (HCC): Status: ACTIVE | Noted: 2018-04-09

## 2022-03-19 PROBLEM — J96.02 ACUTE RESPIRATORY FAILURE WITH HYPOXIA AND HYPERCAPNIA (HCC): Status: ACTIVE | Noted: 2018-04-09

## 2022-03-19 PROBLEM — Z88.9 H/O SEASONAL ALLERGIES: Status: ACTIVE | Noted: 2018-04-09

## 2024-01-09 ENCOUNTER — OFFICE VISIT (OUTPATIENT)
Dept: PRIMARY CARE CLINIC | Facility: CLINIC | Age: 69
End: 2024-01-09
Payer: COMMERCIAL

## 2024-01-09 VITALS
OXYGEN SATURATION: 95 % | WEIGHT: 143 LBS | SYSTOLIC BLOOD PRESSURE: 140 MMHG | DIASTOLIC BLOOD PRESSURE: 60 MMHG | HEIGHT: 68 IN | BODY MASS INDEX: 21.67 KG/M2 | TEMPERATURE: 97 F | HEART RATE: 69 BPM

## 2024-01-09 DIAGNOSIS — R73.03 PREDIABETES: ICD-10-CM

## 2024-01-09 DIAGNOSIS — M54.50 CHRONIC BILATERAL LOW BACK PAIN WITHOUT SCIATICA: ICD-10-CM

## 2024-01-09 DIAGNOSIS — Z12.5 SCREENING FOR PROSTATE CANCER: ICD-10-CM

## 2024-01-09 DIAGNOSIS — G43.109 OCULAR MIGRAINE: ICD-10-CM

## 2024-01-09 DIAGNOSIS — Z13.228 SCREENING FOR METABOLIC DISORDER: ICD-10-CM

## 2024-01-09 DIAGNOSIS — R03.0 ELEVATED BP WITHOUT DIAGNOSIS OF HYPERTENSION: Primary | ICD-10-CM

## 2024-01-09 DIAGNOSIS — Z53.20 COLONOSCOPY REFUSED: ICD-10-CM

## 2024-01-09 DIAGNOSIS — G89.29 CHRONIC PAIN OF BOTH SHOULDERS: ICD-10-CM

## 2024-01-09 DIAGNOSIS — B07.9 VIRAL WARTS, UNSPECIFIED TYPE: ICD-10-CM

## 2024-01-09 DIAGNOSIS — E78.5 HYPERLIPIDEMIA, UNSPECIFIED HYPERLIPIDEMIA TYPE: ICD-10-CM

## 2024-01-09 DIAGNOSIS — M25.512 CHRONIC PAIN OF BOTH SHOULDERS: ICD-10-CM

## 2024-01-09 DIAGNOSIS — G89.29 CHRONIC BILATERAL LOW BACK PAIN WITHOUT SCIATICA: ICD-10-CM

## 2024-01-09 DIAGNOSIS — R51.9 CHRONIC NONINTRACTABLE HEADACHE, UNSPECIFIED HEADACHE TYPE: ICD-10-CM

## 2024-01-09 DIAGNOSIS — G89.29 CHRONIC NONINTRACTABLE HEADACHE, UNSPECIFIED HEADACHE TYPE: ICD-10-CM

## 2024-01-09 DIAGNOSIS — Z72.0 TOBACCO ABUSE: ICD-10-CM

## 2024-01-09 DIAGNOSIS — R03.0 ELEVATED BP WITHOUT DIAGNOSIS OF HYPERTENSION: ICD-10-CM

## 2024-01-09 DIAGNOSIS — M25.511 CHRONIC PAIN OF BOTH SHOULDERS: ICD-10-CM

## 2024-01-09 DIAGNOSIS — L98.9 SKIN LESIONS: ICD-10-CM

## 2024-01-09 DIAGNOSIS — J44.9 CHRONIC OBSTRUCTIVE PULMONARY DISEASE, UNSPECIFIED COPD TYPE (HCC): ICD-10-CM

## 2024-01-09 DIAGNOSIS — M54.2 CERVICALGIA: ICD-10-CM

## 2024-01-09 LAB
ALBUMIN SERPL-MCNC: 4.1 G/DL (ref 3.2–4.6)
ALBUMIN/GLOB SERPL: 1.2 (ref 0.4–1.6)
ALP SERPL-CCNC: 77 U/L (ref 50–136)
ALT SERPL-CCNC: 53 U/L (ref 12–65)
ANION GAP SERPL CALC-SCNC: 4 MMOL/L (ref 2–11)
AST SERPL-CCNC: 30 U/L (ref 15–37)
BASOPHILS # BLD: 0.1 K/UL (ref 0–0.2)
BASOPHILS NFR BLD: 1 % (ref 0–2)
BILIRUB SERPL-MCNC: 0.3 MG/DL (ref 0.2–1.1)
BUN SERPL-MCNC: 31 MG/DL (ref 8–23)
CALCIUM SERPL-MCNC: 9.6 MG/DL (ref 8.3–10.4)
CHLORIDE SERPL-SCNC: 104 MMOL/L (ref 103–113)
CHOLEST SERPL-MCNC: 203 MG/DL
CO2 SERPL-SCNC: 30 MMOL/L (ref 21–32)
DIFFERENTIAL METHOD BLD: ABNORMAL
EOSINOPHIL # BLD: 0.4 K/UL (ref 0–0.8)
EOSINOPHIL NFR BLD: 5 % (ref 0.5–7.8)
ERYTHROCYTE [DISTWIDTH] IN BLOOD BY AUTOMATED COUNT: 14.8 % (ref 11.9–14.6)
GLOBULIN SER CALC-MCNC: 3.4 G/DL (ref 2.8–4.5)
GLUCOSE SERPL-MCNC: 106 MG/DL (ref 65–100)
HCT VFR BLD AUTO: 48 % (ref 41.1–50.3)
HDLC SERPL-MCNC: 51 MG/DL (ref 40–60)
HDLC SERPL: 4
HGB BLD-MCNC: 15.6 G/DL (ref 13.6–17.2)
IMM GRANULOCYTES # BLD AUTO: 0 K/UL (ref 0–0.5)
IMM GRANULOCYTES NFR BLD AUTO: 0 % (ref 0–5)
LDLC SERPL CALC-MCNC: 116.4 MG/DL
LYMPHOCYTES # BLD: 2.4 K/UL (ref 0.5–4.6)
LYMPHOCYTES NFR BLD: 30 % (ref 13–44)
MCH RBC QN AUTO: 29.2 PG (ref 26.1–32.9)
MCHC RBC AUTO-ENTMCNC: 32.5 G/DL (ref 31.4–35)
MCV RBC AUTO: 89.7 FL (ref 82–102)
MONOCYTES # BLD: 0.7 K/UL (ref 0.1–1.3)
MONOCYTES NFR BLD: 9 % (ref 4–12)
NEUTS SEG # BLD: 4.3 K/UL (ref 1.7–8.2)
NEUTS SEG NFR BLD: 55 % (ref 43–78)
NRBC # BLD: 0 K/UL (ref 0–0.2)
PLATELET # BLD AUTO: 619 K/UL (ref 150–450)
PMV BLD AUTO: 9.3 FL (ref 9.4–12.3)
POTASSIUM SERPL-SCNC: 4.7 MMOL/L (ref 3.5–5.1)
PROT SERPL-MCNC: 7.5 G/DL (ref 6.3–8.2)
PSA SERPL-MCNC: 1.7 NG/ML
RBC # BLD AUTO: 5.35 M/UL (ref 4.23–5.6)
SODIUM SERPL-SCNC: 138 MMOL/L (ref 136–146)
TRIGL SERPL-MCNC: 178 MG/DL (ref 35–150)
TSH, 3RD GENERATION: 1.1 UIU/ML (ref 0.36–3.74)
VLDLC SERPL CALC-MCNC: 35.6 MG/DL (ref 6–23)
WBC # BLD AUTO: 7.9 K/UL (ref 4.3–11.1)

## 2024-01-09 PROCEDURE — 1123F ACP DISCUSS/DSCN MKR DOCD: CPT | Performed by: FAMILY MEDICINE

## 2024-01-09 PROCEDURE — 99204 OFFICE O/P NEW MOD 45 MIN: CPT | Performed by: FAMILY MEDICINE

## 2024-01-09 RX ORDER — OMEGA-3 FATTY ACIDS/FISH OIL 300-1000MG
3-5 CAPSULE ORAL EVERY 6 HOURS PRN
COMMUNITY
End: 2024-01-09 | Stop reason: SDUPTHER

## 2024-01-09 RX ORDER — OMEGA-3 FATTY ACIDS/FISH OIL 300-1000MG
2 CAPSULE ORAL EVERY 8 HOURS PRN
Qty: 30 CAPSULE | Refills: 1 | Status: SHIPPED | OUTPATIENT
Start: 2024-01-09

## 2024-01-09 RX ORDER — DIPHENHYDRAMINE HCL 25 MG
25 CAPSULE ORAL EVERY 6 HOURS PRN
COMMUNITY

## 2024-01-09 SDOH — ECONOMIC STABILITY: INCOME INSECURITY: HOW HARD IS IT FOR YOU TO PAY FOR THE VERY BASICS LIKE FOOD, HOUSING, MEDICAL CARE, AND HEATING?: NOT HARD AT ALL

## 2024-01-09 SDOH — ECONOMIC STABILITY: FOOD INSECURITY: WITHIN THE PAST 12 MONTHS, THE FOOD YOU BOUGHT JUST DIDN'T LAST AND YOU DIDN'T HAVE MONEY TO GET MORE.: NEVER TRUE

## 2024-01-09 SDOH — ECONOMIC STABILITY: FOOD INSECURITY: WITHIN THE PAST 12 MONTHS, YOU WORRIED THAT YOUR FOOD WOULD RUN OUT BEFORE YOU GOT MONEY TO BUY MORE.: NEVER TRUE

## 2024-01-09 SDOH — ECONOMIC STABILITY: HOUSING INSECURITY
IN THE LAST 12 MONTHS, WAS THERE A TIME WHEN YOU DID NOT HAVE A STEADY PLACE TO SLEEP OR SLEPT IN A SHELTER (INCLUDING NOW)?: NO

## 2024-01-09 ASSESSMENT — ENCOUNTER SYMPTOMS
RESPIRATORY NEGATIVE: 1
GASTROINTESTINAL NEGATIVE: 1
BACK PAIN: 1
EYES NEGATIVE: 1
ALLERGIC/IMMUNOLOGIC NEGATIVE: 1

## 2024-01-09 ASSESSMENT — PATIENT HEALTH QUESTIONNAIRE - PHQ9
SUM OF ALL RESPONSES TO PHQ QUESTIONS 1-9: 0
2. FEELING DOWN, DEPRESSED OR HOPELESS: 0
SUM OF ALL RESPONSES TO PHQ9 QUESTIONS 1 & 2: 0
SUM OF ALL RESPONSES TO PHQ QUESTIONS 1-9: 0
1. LITTLE INTEREST OR PLEASURE IN DOING THINGS: 0

## 2024-01-09 NOTE — PROGRESS NOTES
[facility]  [address]  [place] [address]  Office : [phone]  Fax : [phone]      Subjective:  The patient is a 68 y.o. male  who presents for f/u on multiple chronic medical conditions-good compliance with medications--pt here to get routeine labs and need refill on meds.no cardiopulmonary symptoms  Elevated BP need monitoring  COPD=asymptomatic-pt refuses inhalers  Chronic lowback pain/cervicalgia/bilateral shoulder pain -arthritis-pt not interested in xray or ortho consult-willing to consider PT  Migraine headache-mor on left side--worse and more frequently-seen eye MD-no acute cause found-pt interested in MRI brain-no N/V/D/C/ABD PAIN/photosensitivity  Skin lesions-lower extremities-wants to see derm-family hx of skin cancer  Tobacco use-daily    Patient Active Problem List   Diagnosis    Ocular migraine    H/O seasonal allergies    BPPV (benign paroxysmal positional vertigo)    Attention deficit hyperactivity disorder (ADHD)    Acute respiratory failure with hypoxia and hypercapnia ([facility])    COPD exacerbation ([facility])    Tobacco abuse    Hospital discharge follow-up    COPD (chronic obstructive pulmonary disease) (HCC)    Screening for prostate cancer    Colonoscopy refused    Chronic nonintractable headache    Skin lesions    Viral warts    Chronic pain of both shoulders    Cervicalgia    Chronic bilateral low back pain without sciatica    Prediabetes    Hyperlipidemia       Past Medical History:   Diagnosis Date    COPD (chronic obstructive pulmonary disease) (HCC)        Past Surgical History:   Procedure Laterality Date    MULTIPLE TOOTH EXTRACTIONS  2004       Social History     Socioeconomic History    Marital status:      Spouse name: Not on file    Number of children: Not on file    Years of education: Not on file    Highest education level: Not on file   Occupational History    Not on file   Tobacco Use    Smoking status: Every Day     Current packs/day: 0.05

## 2024-01-10 LAB
EST. AVERAGE GLUCOSE BLD GHB EST-MCNC: 111 MG/DL
HBA1C MFR BLD: 5.5 % (ref 4.8–5.6)

## 2024-01-17 ENCOUNTER — APPOINTMENT (RX ONLY)
Dept: URBAN - METROPOLITAN AREA CLINIC 24 | Facility: CLINIC | Age: 69
Setting detail: DERMATOLOGY
End: 2024-01-17

## 2024-01-17 DIAGNOSIS — D18.0 HEMANGIOMA: ICD-10-CM

## 2024-01-17 DIAGNOSIS — D485 NEOPLASM OF UNCERTAIN BEHAVIOR OF SKIN: ICD-10-CM

## 2024-01-17 DIAGNOSIS — L81.4 OTHER MELANIN HYPERPIGMENTATION: ICD-10-CM

## 2024-01-17 DIAGNOSIS — L82.1 OTHER SEBORRHEIC KERATOSIS: ICD-10-CM

## 2024-01-17 DIAGNOSIS — D22 MELANOCYTIC NEVI: ICD-10-CM

## 2024-01-17 DIAGNOSIS — B07.8 OTHER VIRAL WARTS: ICD-10-CM

## 2024-01-17 DIAGNOSIS — L57.8 OTHER SKIN CHANGES DUE TO CHRONIC EXPOSURE TO NONIONIZING RADIATION: ICD-10-CM

## 2024-01-17 DIAGNOSIS — Z71.89 OTHER SPECIFIED COUNSELING: ICD-10-CM

## 2024-01-17 PROBLEM — D18.01 HEMANGIOMA OF SKIN AND SUBCUTANEOUS TISSUE: Status: ACTIVE | Noted: 2024-01-17

## 2024-01-17 PROBLEM — D48.5 NEOPLASM OF UNCERTAIN BEHAVIOR OF SKIN: Status: ACTIVE | Noted: 2024-01-17

## 2024-01-17 PROBLEM — D22.5 MELANOCYTIC NEVI OF TRUNK: Status: ACTIVE | Noted: 2024-01-17

## 2024-01-17 PROCEDURE — ? REFERRAL CORRESPONDENCE

## 2024-01-17 PROCEDURE — 11102 TANGNTL BX SKIN SINGLE LES: CPT

## 2024-01-17 PROCEDURE — ? OTHER

## 2024-01-17 PROCEDURE — 99203 OFFICE O/P NEW LOW 30 MIN: CPT | Mod: 25

## 2024-01-17 PROCEDURE — ? COUNSELING

## 2024-01-17 PROCEDURE — ? BIOPSY BY SHAVE METHOD

## 2024-01-17 ASSESSMENT — LOCATION DETAILED DESCRIPTION DERM
LOCATION DETAILED: STERNAL NOTCH
LOCATION DETAILED: INFERIOR THORACIC SPINE
LOCATION DETAILED: SUPERIOR THORACIC SPINE
LOCATION DETAILED: RIGHT ANTERIOR DISTAL THIGH
LOCATION DETAILED: RIGHT RADIAL PALM
LOCATION DETAILED: RIGHT CENTRAL MALAR CHEEK
LOCATION DETAILED: LEFT CENTRAL MALAR CHEEK

## 2024-01-17 ASSESSMENT — LOCATION SIMPLE DESCRIPTION DERM
LOCATION SIMPLE: LEFT CHEEK
LOCATION SIMPLE: RIGHT HAND
LOCATION SIMPLE: CHEST
LOCATION SIMPLE: RIGHT CHEEK
LOCATION SIMPLE: RIGHT THIGH
LOCATION SIMPLE: UPPER BACK

## 2024-01-17 ASSESSMENT — LOCATION ZONE DERM
LOCATION ZONE: TRUNK
LOCATION ZONE: LEG
LOCATION ZONE: HAND
LOCATION ZONE: FACE

## 2024-01-17 NOTE — PROCEDURE: BIOPSY BY SHAVE METHOD
Detail Level: Detailed
Depth Of Biopsy: dermis
Was A Bandage Applied: Yes
Size Of Lesion In Cm: 0
Biopsy Type: H and E
Biopsy Method: Dermablade
Anesthesia Type: 1% lidocaine with epinephrine
Anesthesia Volume In Cc: 0.5
Hemostasis: Drysol
Wound Care: Petrolatum
Dressing: bandage
Destruction After The Procedure: No
Type Of Destruction Used: Curettage
Curettage Text: The wound bed was treated with curettage after the biopsy was performed.
Cryotherapy Text: The wound bed was treated with cryotherapy after the biopsy was performed.
Electrodesiccation Text: The wound bed was treated with electrodesiccation after the biopsy was performed.
Electrodesiccation And Curettage Text: The wound bed was treated with electrodesiccation and curettage after the biopsy was performed.
Silver Nitrate Text: The wound bed was treated with silver nitrate after the biopsy was performed.
Lab: [id number]
Lab Facility: [id number]
Consent: Written consent was obtained and risks were reviewed including but not limited to scarring, infection, bleeding, scabbing, incomplete removal, nerve damage and allergy to anesthesia.
Post-Care Instructions: I reviewed with the patient in detail post-care instructions. Patient is to keep the biopsy site dry overnight, and then apply bacitracin twice daily until healed. Patient may apply hydrogen peroxide soaks to remove any crusting.
Notification Instructions: Patient will be notified of biopsy results. However, patient instructed to call the office if not contacted within 2 weeks.
Billing Type: Third-Party Bill
Information: Selecting Yes will display possible errors in your note based on the variables you have selected. This validation is only offered as a suggestion for you. PLEASE NOTE THAT THE VALIDATION TEXT WILL BE REMOVED WHEN YOU FINALIZE YOUR NOTE. IF YOU WANT TO FAX A PRELIMINARY NOTE YOU WILL NEED TO TOGGLE THIS TO 'NO' IF YOU DO NOT WANT IT IN YOUR FAXED NOTE.

## 2024-01-17 NOTE — PROCEDURE: OTHER
Other (Free Text): Offered LN2, patient declined
Detail Level: Zone
Render Risk Assessment In Note?: no
Note Text (......Xxx Chief Complaint.): This diagnosis correlates with the

## 2024-01-24 ENCOUNTER — TELEPHONE (OUTPATIENT)
Dept: PRIMARY CARE CLINIC | Facility: CLINIC | Age: 69
End: 2024-01-24

## 2024-01-24 NOTE — TELEPHONE ENCOUNTER
Peer to peer information.  Pt is schedule for MRI of the brain on 01/25/2024, his insurance is [facility] [phone] case # [id number].  the case does not need medical criteria. thank you.

## 2024-01-25 ENCOUNTER — HOSPITAL ENCOUNTER (OUTPATIENT)
Dept: MRI IMAGING | Age: 69
Discharge: HOME | End: 2024-01-25
Attending: FAMILY MEDICINE
Payer: COMMERCIAL

## 2024-01-25 DIAGNOSIS — R51.9 CHRONIC NONINTRACTABLE HEADACHE, UNSPECIFIED HEADACHE TYPE: ICD-10-CM

## 2024-01-25 DIAGNOSIS — G89.29 CHRONIC NONINTRACTABLE HEADACHE, UNSPECIFIED HEADACHE TYPE: ICD-10-CM

## 2024-01-25 DIAGNOSIS — R03.0 ELEVATED BP WITHOUT DIAGNOSIS OF HYPERTENSION: ICD-10-CM

## 2024-01-25 PROCEDURE — 70551 MRI BRAIN STEM W/O DYE: CPT

## 2024-01-25 NOTE — TELEPHONE ENCOUNTER
Please call to do the peer to peer for the MRI Brain.  The phone number is on the message below-

## 2024-02-08 PROBLEM — Z12.5 SCREENING FOR PROSTATE CANCER: Status: RESOLVED | Noted: 2024-01-09 | Resolved: 2024-02-08

## 2024-02-15 ENCOUNTER — TELEPHONE (OUTPATIENT)
Dept: PRIMARY CARE CLINIC | Facility: CLINIC | Age: 69
End: 2024-02-15

## 2024-02-15 PROBLEM — Z12.11 SCREENING FOR COLON CANCER: Status: ACTIVE | Noted: 2024-01-09

## 2024-02-15 PROBLEM — R03.0 ELEVATED BP WITHOUT DIAGNOSIS OF HYPERTENSION: Status: ACTIVE | Noted: 2024-02-15

## 2024-03-16 PROBLEM — Z12.11 SCREENING FOR COLON CANCER: Status: RESOLVED | Noted: 2024-01-09 | Resolved: 2024-03-16

## 2024-04-09 ENCOUNTER — OFFICE VISIT (OUTPATIENT)
Dept: PRIMARY CARE CLINIC | Facility: CLINIC | Age: 69
End: 2024-04-09
Payer: COMMERCIAL

## 2024-04-09 VITALS
HEART RATE: 74 BPM | TEMPERATURE: 98.4 F | BODY MASS INDEX: 21.98 KG/M2 | SYSTOLIC BLOOD PRESSURE: 140 MMHG | DIASTOLIC BLOOD PRESSURE: 80 MMHG | HEIGHT: 68 IN | OXYGEN SATURATION: 94 % | WEIGHT: 145 LBS

## 2024-04-09 DIAGNOSIS — R94.31 ABNORMAL EKG: ICD-10-CM

## 2024-04-09 DIAGNOSIS — E78.5 HYPERLIPIDEMIA, UNSPECIFIED HYPERLIPIDEMIA TYPE: ICD-10-CM

## 2024-04-09 DIAGNOSIS — R06.02 SOB (SHORTNESS OF BREATH): Primary | ICD-10-CM

## 2024-04-09 DIAGNOSIS — M54.50 CHRONIC BILATERAL LOW BACK PAIN WITHOUT SCIATICA: ICD-10-CM

## 2024-04-09 DIAGNOSIS — Z12.11 SCREENING FOR COLON CANCER: ICD-10-CM

## 2024-04-09 DIAGNOSIS — J44.9 CHRONIC OBSTRUCTIVE PULMONARY DISEASE, UNSPECIFIED COPD TYPE (HCC): ICD-10-CM

## 2024-04-09 DIAGNOSIS — I10 PRIMARY HYPERTENSION: ICD-10-CM

## 2024-04-09 DIAGNOSIS — Z12.2 ENCOUNTER FOR SCREENING FOR LUNG CANCER: ICD-10-CM

## 2024-04-09 DIAGNOSIS — G43.109 OCULAR MIGRAINE: ICD-10-CM

## 2024-04-09 DIAGNOSIS — G89.29 CHRONIC BILATERAL LOW BACK PAIN WITHOUT SCIATICA: ICD-10-CM

## 2024-04-09 DIAGNOSIS — Z72.0 TOBACCO ABUSE: ICD-10-CM

## 2024-04-09 PROCEDURE — 99214 OFFICE O/P EST MOD 30 MIN: CPT | Performed by: FAMILY MEDICINE

## 2024-04-09 PROCEDURE — 3077F SYST BP >= 140 MM HG: CPT | Performed by: FAMILY MEDICINE

## 2024-04-09 PROCEDURE — 1123F ACP DISCUSS/DSCN MKR DOCD: CPT | Performed by: FAMILY MEDICINE

## 2024-04-09 PROCEDURE — 3079F DIAST BP 80-89 MM HG: CPT | Performed by: FAMILY MEDICINE

## 2024-04-09 PROCEDURE — 93000 ELECTROCARDIOGRAM COMPLETE: CPT | Performed by: FAMILY MEDICINE

## 2024-04-09 RX ORDER — LISINOPRIL 5 MG/1
10 TABLET ORAL DAILY
Qty: 30 TABLET | Refills: 2 | Status: SHIPPED | OUTPATIENT
Start: 2024-04-09

## 2024-04-09 RX ORDER — LORATADINE 10 MG/1
10 TABLET ORAL EVERY MORNING
COMMUNITY

## 2024-04-09 RX ORDER — EPINEPHRINE INHALATION 125 UG/1
2 AEROSOL RESPIRATORY (INHALATION) DAILY
COMMUNITY

## 2024-04-09 RX ORDER — FLUTICASONE FUROATE, UMECLIDINIUM BROMIDE AND VILANTEROL TRIFENATATE 100; 62.5; 25 UG/1; UG/1; UG/1
1 POWDER RESPIRATORY (INHALATION) DAILY
Qty: 1 EACH | Refills: 2 | Status: SHIPPED | OUTPATIENT
Start: 2024-04-09

## 2024-04-09 RX ORDER — ALBUTEROL SULFATE 90 UG/1
2 AEROSOL, METERED RESPIRATORY (INHALATION) 4 TIMES DAILY PRN
Qty: 1 EACH | Refills: 5 | Status: SHIPPED | OUTPATIENT
Start: 2024-04-09

## 2024-04-09 ASSESSMENT — ENCOUNTER SYMPTOMS
SHORTNESS OF BREATH: 1
ALLERGIC/IMMUNOLOGIC NEGATIVE: 1
BACK PAIN: 1
EYES NEGATIVE: 1
GASTROINTESTINAL NEGATIVE: 1

## 2024-04-09 NOTE — PROGRESS NOTES
[facility] - [facility] 14  [address]  [place] [address]  Office : [phone]  Fax : [phone]      Subjective:  The patient is a 68 y.o. male  who presents for f/u on multiple chronic medical conditions-good compliance with medications--pt here to get routeine labs and need refill on meds.no cardiopulmonary symptoms  Hypertension-meed treatment  Hyperlipidemia-diet controlled  COPD=sob on and off few months--interested in inhalers-denies CP/palpitations--interested in cardiac work up  CXR =COPD  CT chest last years -no lung nodules-need recheck  Chronic lowback pain/cervicalgia/bilateral shoulder pain -arthritis-pt not interested in xray or ortho consult-willing to consider PT  Migraine headache-mor on left side--worse and more frequently-seen eye MD-no acute cause found-MRI brain-no acute changes--no N/V/D/C/ABD PAIN/photosensitivity  Skin lesions-lower extremities-wants to see derm-family hx of skin cancer  Tobacco use-daily    Patient Active Problem List   Diagnosis    Ocular migraine    H/O seasonal allergies    BPPV (benign paroxysmal positional vertigo)    Attention deficit hyperactivity disorder (ADHD)    Acute respiratory failure with hypoxia and hypercapnia (HCC)    COPD exacerbation (HCC)    Tobacco abuse    Hospital discharge follow-up    COPD (chronic obstructive pulmonary disease) (HCC)    Chronic nonintractable headache    Skin lesions    Viral warts    Chronic pain of both shoulders    Cervicalgia    Chronic bilateral low back pain without sciatica    Prediabetes    Hyperlipidemia    Elevated BP without diagnosis of hypertension    Primary hypertension    SOB (shortness of breath)    Abnormal EKG       Past Medical History:   Diagnosis Date    COPD (chronic obstructive pulmonary disease) (HCC)        Past Surgical History:   Procedure Laterality Date    MULTIPLE TOOTH EXTRACTIONS  2004       Social History     Socioeconomic History    Marital status:      Spouse name:

## 2024-05-15 ENCOUNTER — INITIAL CONSULT (OUTPATIENT)
Age: 69
End: 2024-05-15
Payer: COMMERCIAL

## 2024-05-15 VITALS
WEIGHT: 140.5 LBS | HEIGHT: 69 IN | SYSTOLIC BLOOD PRESSURE: 138 MMHG | HEART RATE: 73 BPM | BODY MASS INDEX: 20.81 KG/M2 | DIASTOLIC BLOOD PRESSURE: 60 MMHG

## 2024-05-15 DIAGNOSIS — E78.2 MIXED HYPERLIPIDEMIA: ICD-10-CM

## 2024-05-15 DIAGNOSIS — I10 PRIMARY HYPERTENSION: ICD-10-CM

## 2024-05-15 DIAGNOSIS — J44.9 CHRONIC OBSTRUCTIVE PULMONARY DISEASE, UNSPECIFIED COPD TYPE (HCC): ICD-10-CM

## 2024-05-15 DIAGNOSIS — Z72.0 TOBACCO ABUSE: ICD-10-CM

## 2024-05-15 DIAGNOSIS — R94.31 ABNORMAL EKG: ICD-10-CM

## 2024-05-15 DIAGNOSIS — R06.09 DYSPNEA ON EXERTION: Primary | ICD-10-CM

## 2024-05-15 PROCEDURE — 99244 OFF/OP CNSLTJ NEW/EST MOD 40: CPT | Performed by: INTERNAL MEDICINE

## 2024-05-15 PROCEDURE — 3078F DIAST BP <80 MM HG: CPT | Performed by: INTERNAL MEDICINE

## 2024-05-15 PROCEDURE — 3075F SYST BP GE 130 - 139MM HG: CPT | Performed by: INTERNAL MEDICINE

## 2024-05-15 RX ORDER — LISINOPRIL 5 MG/1
10 TABLET ORAL DAILY
Qty: 180 TABLET | Refills: 1 | OUTPATIENT
Start: 2024-05-15

## 2024-05-15 ASSESSMENT — ENCOUNTER SYMPTOMS
ABDOMINAL PAIN: 0
DOUBLE VISION: 0
HOARSE VOICE: 0
HEMATEMESIS: 0
HEMOPTYSIS: 0
HEMATOCHEZIA: 0
WHEEZING: 0
EYE REDNESS: 0

## 2024-05-15 NOTE — PROGRESS NOTES
[facility] CARDIOLOGY  [address] [place] [address], [address]  [place] [address]  PHONE: [phone]          05/15/24    NAME:  Jn Ross  : 1955  MRN: [id number]         SUBJECTIVE:   Jn Ross is a 68 y.o. male seen for a visit regarding the following:     Chief Complaint   Patient presents with    Consultation     Arrthymia on EKG           HPI:    Cardiac problem list:  1.  Dyspnea on exertion  2.  Abnormal EKG-sinus rhythm, nonspecific IVCD, voltage criteria for LVH with anterolateral ST and T wave changes  3.  Hypertension  4.  COPD  5.  Smoker  6.  Hyperlipidemia    Dear Dr. Ramirez,  I saw Mr. Ross who is a pleasant 68-year-old gentleman in cardiovascular consultation for dyspnea on exertion with an underlying abnormal EKG, hypertension, hyperlipidemia, is a smoker and has COPD.    Dyspnea on exertion/abnormal EKG-has had some dyspnea on exertion for a while but he feels that especially climbing up stairs he has gotten more short of breath.  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  No significant lower extremity edema orthopnea or PND.  Occasional wheezing, understands that he has underlying COPD.  Abnormal EKG-has nonspecific IVCD and voltage criteria for LVH with ST-T wave changes noted in the anterolateral leads that could be consistent with ischemia although he has no significant chest pain in any way suggestive of angina.    Hypertension: Denies headaches or blurry vision.  Recently started lisinopril and his pressures are better.    Smoker-understands the need to quit completely.  Smokes about 5 cigarettes a day.    Hyperlipidemia-would benefit from statin therapy especially since he has coronary calcifications noted on his CT scan of the chest that he did as a screening test for lung cancer.      Past Medical History, Past Surgical History, Family

## 2024-05-23 ENCOUNTER — PREP FOR PROCEDURE (OUTPATIENT)
Dept: GASTROENTEROLOGY | Age: 69
End: 2024-05-23

## 2024-05-23 DIAGNOSIS — Z12.11 COLON CANCER SCREENING: ICD-10-CM

## 2024-05-23 RX ORDER — SODIUM CHLORIDE 9 MG/ML
25 INJECTION, SOLUTION INTRAVENOUS PRN
Status: CANCELLED | OUTPATIENT
Start: 2024-05-23

## 2024-05-23 RX ORDER — SODIUM CHLORIDE 0.9 % (FLUSH) 0.9 %
5-40 SYRINGE (ML) INJECTION EVERY 12 HOURS SCHEDULED
Status: CANCELLED | OUTPATIENT
Start: 2024-05-23

## 2024-05-23 RX ORDER — SODIUM CHLORIDE 0.9 % (FLUSH) 0.9 %
5-40 SYRINGE (ML) INJECTION PRN
Status: CANCELLED | OUTPATIENT
Start: 2024-05-23

## 2024-06-07 NOTE — PROGRESS NOTES
Patient verified name, , and procedure.    Type: 1a; abbreviated assessment per anesthesia guidelines.    Labs ordered per anesthesia: None.    Instructed pt that they will be notified the day before their procedure by the GI Lab of the time of arrival if their procedure is [place], and by Pre-op for [facility] procedures. Arrival times should be called by 5 pm. If no phone call is received, the patient should contact the respective hospital. The GI Lab telephone number is [phone] and [facility] Pre-Op is [phone].     Instructed pt to follow diet and prep instructions, per MD, including NPO status. If patient has NOT received instructions from office, patient advised to call the MD's office as soon as possible. Phone number provided.    Pt may bathe or shower the night before and the am of surgery with non-moisturizing soap. No lotions, oils, powders, make-up or colognes/perfumes on skin. No jewelry. Wear loose-fitting, comfortable, clean clothing.     Pt must have adult present in building the entire time that can drive them home.     Medications to take on the day of procedure: Trelegy.    Medications to hold: Lisinopril DOS, per anesthesia guidelines.     The following discharge instructions were reviewed with patient: medication given during procedure may cause drowsiness for several hours, therefore, do not drive or operate machinery for remainder of the day. You may not drink alcohol on the day of your procedure, please resume regular diet and activity unless otherwise directed. You may experience abdominal distention for several hours that is relieved by the passage of gas. Contact your physician if you have any of the following: fever or chills, severe abdominal pain, or excessive amount of bleeding or a large amount when having a bowel movement. Occasional specks of blood with bowel movement would not be unusual.    Opportunity for questions and clarification was provided. Pt verbalizes

## 2024-06-12 ENCOUNTER — ANESTHESIA EVENT (OUTPATIENT)
Dept: ENDOSCOPY | Age: 69
End: 2024-06-12
Payer: COMMERCIAL

## 2024-06-13 ENCOUNTER — HOSPITAL ENCOUNTER (OUTPATIENT)
Age: 69
Setting detail: OUTPATIENT SURGERY
Discharge: HOME | End: 2024-06-13
Attending: INTERNAL MEDICINE | Admitting: INTERNAL MEDICINE
Payer: COMMERCIAL

## 2024-06-13 ENCOUNTER — ANESTHESIA (OUTPATIENT)
Dept: ENDOSCOPY | Age: 69
End: 2024-06-13
Payer: COMMERCIAL

## 2024-06-13 VITALS
TEMPERATURE: 98 F | DIASTOLIC BLOOD PRESSURE: 68 MMHG | WEIGHT: 135.8 LBS | HEART RATE: 64 BPM | HEIGHT: 69 IN | OXYGEN SATURATION: 98 % | BODY MASS INDEX: 20.11 KG/M2 | RESPIRATION RATE: 11 BRPM | SYSTOLIC BLOOD PRESSURE: 115 MMHG

## 2024-06-13 PROCEDURE — 7100000010 HC PHASE II RECOVERY - FIRST 15 MIN: Performed by: INTERNAL MEDICINE

## 2024-06-13 PROCEDURE — 3700000000 HC ANESTHESIA ATTENDED CARE: Performed by: INTERNAL MEDICINE

## 2024-06-13 PROCEDURE — 2709999900 HC NON-CHARGEABLE SUPPLY: Performed by: INTERNAL MEDICINE

## 2024-06-13 PROCEDURE — 45378 DIAGNOSTIC COLONOSCOPY: CPT | Performed by: INTERNAL MEDICINE

## 2024-06-13 PROCEDURE — 2580000003 HC RX 258: Performed by: ANESTHESIOLOGY

## 2024-06-13 PROCEDURE — 3700000001 HC ADD 15 MINUTES (ANESTHESIA): Performed by: INTERNAL MEDICINE

## 2024-06-13 PROCEDURE — 7100000011 HC PHASE II RECOVERY - ADDTL 15 MIN: Performed by: INTERNAL MEDICINE

## 2024-06-13 PROCEDURE — 3609027000 HC COLONOSCOPY: Performed by: INTERNAL MEDICINE

## 2024-06-13 RX ORDER — SODIUM CHLORIDE 0.9 % (FLUSH) 0.9 %
5-40 SYRINGE (ML) INJECTION EVERY 12 HOURS SCHEDULED
Status: DISCONTINUED | OUTPATIENT
Start: 2024-06-13 | End: 2024-06-13 | Stop reason: HOSPADM

## 2024-06-13 RX ORDER — SODIUM CHLORIDE 0.9 % (FLUSH) 0.9 %
5-40 SYRINGE (ML) INJECTION PRN
Status: DISCONTINUED | OUTPATIENT
Start: 2024-06-13 | End: 2024-06-13 | Stop reason: HOSPADM

## 2024-06-13 RX ORDER — SODIUM CHLORIDE 9 MG/ML
25 INJECTION, SOLUTION INTRAVENOUS PRN
Status: DISCONTINUED | OUTPATIENT
Start: 2024-06-13 | End: 2024-06-13 | Stop reason: HOSPADM

## 2024-06-13 RX ORDER — GLYCOPYRROLATE 0.2 MG/ML
INJECTION INTRAMUSCULAR; INTRAVENOUS PRN
Status: DISCONTINUED | OUTPATIENT
Start: 2024-06-13 | End: 2024-06-13 | Stop reason: SDUPTHER

## 2024-06-13 RX ORDER — SODIUM CHLORIDE, SODIUM LACTATE, POTASSIUM CHLORIDE, CALCIUM CHLORIDE 600; 310; 30; 20 MG/100ML; MG/100ML; MG/100ML; MG/100ML
INJECTION, SOLUTION INTRAVENOUS CONTINUOUS PRN
Status: DISCONTINUED | OUTPATIENT
Start: 2024-06-13 | End: 2024-06-13 | Stop reason: SDUPTHER

## 2024-06-13 RX ORDER — ONDANSETRON 2 MG/ML
4 INJECTION INTRAMUSCULAR; INTRAVENOUS
Status: DISCONTINUED | OUTPATIENT
Start: 2024-06-13 | End: 2024-06-13 | Stop reason: HOSPADM

## 2024-06-13 RX ORDER — LIDOCAINE HYDROCHLORIDE 20 MG/ML
INJECTION, SOLUTION EPIDURAL; INFILTRATION; INTRACAUDAL; PERINEURAL PRN
Status: DISCONTINUED | OUTPATIENT
Start: 2024-06-13 | End: 2024-06-13 | Stop reason: SDUPTHER

## 2024-06-13 RX ORDER — EPHEDRINE SULFATE/0.9% NACL/PF 50 MG/5 ML
SYRINGE (ML) INTRAVENOUS PRN
Status: DISCONTINUED | OUTPATIENT
Start: 2024-06-13 | End: 2024-06-13 | Stop reason: SDUPTHER

## 2024-06-13 RX ORDER — PROPOFOL 10 MG/ML
INJECTION, EMULSION INTRAVENOUS PRN
Status: DISCONTINUED | OUTPATIENT
Start: 2024-06-13 | End: 2024-06-13 | Stop reason: SDUPTHER

## 2024-06-13 RX ORDER — NALOXONE HYDROCHLORIDE 0.4 MG/ML
INJECTION, SOLUTION INTRAMUSCULAR; INTRAVENOUS; SUBCUTANEOUS PRN
Status: DISCONTINUED | OUTPATIENT
Start: 2024-06-13 | End: 2024-06-13 | Stop reason: HOSPADM

## 2024-06-13 RX ORDER — SODIUM CHLORIDE 9 MG/ML
INJECTION, SOLUTION INTRAVENOUS PRN
Status: DISCONTINUED | OUTPATIENT
Start: 2024-06-13 | End: 2024-06-13 | Stop reason: HOSPADM

## 2024-06-13 RX ORDER — SODIUM CHLORIDE, SODIUM LACTATE, POTASSIUM CHLORIDE, CALCIUM CHLORIDE 600; 310; 30; 20 MG/100ML; MG/100ML; MG/100ML; MG/100ML
INJECTION, SOLUTION INTRAVENOUS CONTINUOUS
Status: DISCONTINUED | OUTPATIENT
Start: 2024-06-13 | End: 2024-06-13 | Stop reason: HOSPADM

## 2024-06-13 RX ORDER — LIDOCAINE HYDROCHLORIDE 10 MG/ML
1 INJECTION, SOLUTION INFILTRATION; PERINEURAL
Status: DISCONTINUED | OUTPATIENT
Start: 2024-06-13 | End: 2024-06-13 | Stop reason: HOSPADM

## 2024-06-13 RX ADMIN — SODIUM CHLORIDE, SODIUM LACTATE, POTASSIUM CHLORIDE, CALCIUM CHLORIDE: 600; 310; 30; 20 INJECTION, SOLUTION INTRAVENOUS at 10:58

## 2024-06-13 RX ADMIN — PROPOFOL 20 MG: 10 INJECTION, EMULSION INTRAVENOUS at 11:12

## 2024-06-13 RX ADMIN — SODIUM CHLORIDE, POTASSIUM CHLORIDE, SODIUM LACTATE AND CALCIUM CHLORIDE: 600; 310; 30; 20 INJECTION, SOLUTION INTRAVENOUS at 10:40

## 2024-06-13 RX ADMIN — PROPOFOL 20 MG: 10 INJECTION, EMULSION INTRAVENOUS at 11:08

## 2024-06-13 RX ADMIN — PROPOFOL 80 MG: 10 INJECTION, EMULSION INTRAVENOUS at 11:01

## 2024-06-13 RX ADMIN — Medication 10 MG: at 11:06

## 2024-06-13 RX ADMIN — LIDOCAINE HYDROCHLORIDE 50 MG: 20 INJECTION, SOLUTION EPIDURAL; INFILTRATION; INTRACAUDAL; PERINEURAL at 11:01

## 2024-06-13 RX ADMIN — LIDOCAINE HYDROCHLORIDE 50 MG: 20 INJECTION, SOLUTION EPIDURAL; INFILTRATION; INTRACAUDAL; PERINEURAL at 11:02

## 2024-06-13 RX ADMIN — Medication 5 MG: at 11:08

## 2024-06-13 RX ADMIN — GLYCOPYRROLATE 0.1 MG: 0.2 INJECTION INTRAMUSCULAR; INTRAVENOUS at 11:05

## 2024-06-13 RX ADMIN — PROPOFOL 20 MG: 10 INJECTION, EMULSION INTRAVENOUS at 11:06

## 2024-06-13 ASSESSMENT — ENCOUNTER SYMPTOMS: SHORTNESS OF BREATH: 1

## 2024-06-13 ASSESSMENT — COPD QUESTIONNAIRES: CAT_SEVERITY: MODERATE

## 2024-06-13 ASSESSMENT — PAIN - FUNCTIONAL ASSESSMENT: PAIN_FUNCTIONAL_ASSESSMENT: 0-10

## 2024-06-13 ASSESSMENT — LIFESTYLE VARIABLES: SMOKING_STATUS: 1

## 2024-06-13 NOTE — ANESTHESIA PRE PROCEDURE
Department of Anesthesiology  Preprocedure Note       Name:  Jn Ross   Age:  68 y.o.  :  1955                                          MRN:  [id number]         Date:  2024      Surgeon: Surgeon(s):  Raudel Chino MD    Procedure: Procedure(s):  COLORECTAL CANCER SCREENING, NOT HIGH RISK    Medications prior to admission:   Prior to Admission medications    Medication Sig Start Date End Date Taking? Authorizing Provider   loratadine (CLARITIN) 10 MG tablet Take 1 tablet by mouth as needed    ProviderVannesa MD   EPINEPHrine (PRIMATENE MIST) 0.125 MG/ACT AERO Inhale 2 puffs into the lungs daily  Patient not taking: Reported on 2024    Vannesa Unger MD   fluticasone-umeclidin-vilant (TRELEGY ELLIPTA) 100-62.5-25 MCG/ACT AEPB inhaler Inhale 1 puff into the lungs daily 24   Helena Ramirez MD   albuterol sulfate HFA (VENTOLIN HFA) 108 (90 Base) MCG/ACT inhaler Inhale 2 puffs into the lungs 4 times daily as needed for Wheezing  Patient not taking: Reported on 2024   Helena Ramirez MD   lisinopril (PRINIVIL;ZESTRIL) 5 MG tablet Take 2 tablets by mouth daily  Patient taking differently: Take 1 tablet by mouth nightly 24   Helena Ramirez MD   diphenhydrAMINE (BENADRYL ALLERGY) 25 MG capsule Take 1 capsule by mouth every 6 hours as needed for Sleep    ProviderVannesa MD   diphenhydrAMINE-APAP, sleep, (TYLENOL PM EXTRA STRENGTH PO) Take by mouth as needed    ProviderVannesa MD   ibuprofen (ADVIL;MOTRIN) 200 MG CAPS capsule Take 2 capsules by mouth every 8 hours as needed for Pain  Patient not taking: Reported on 2024   Helena Ramirez MD       Current medications:    Current Facility-Administered Medications   Medication Dose Route Frequency Provider Last Rate Last Admin   • lidocaine 1 % injection 1 mL  1 mL IntraDERmal Once PRN Dayna Parra MD       • lactated ringers IV soln infusion   IntraVENous Continuous Dayna Parra

## 2024-06-13 NOTE — PROCEDURES
Operative Report    Patient: Jn Ross MRN: [id number]      YOB: 1955  Age: 68 y.o.  Sex: male            Indications:  Screening colon     Preoperative Evaluation: The patient was evaluated prior to the procedure in the GI lab admission area, the patient ASA was recorded .  Consent was obtained from the patient with the risk of perforation bleeding and aspiration.    Anesthesia: ERIKA-per anesthesia    Complications: None; patient tolerated the procedure well.    EBL -insignificant      Procedure: The patient was sedated in the left lateral decubitus position.  Scope was advanced from the rectum to the cecum.  Evaluation was performed to the cecum twice.  The scope was withdrawn to the rectum, retroflexed view was performed.  The rectal exam was normal.  Preparation was excellent.    Findings:  Normal exam     Postoperative Diagnosis:   Normal exam         Recommendations:   Repeat colonoscopy in 10 years       Signed By:  Raudel Chino MD     June 13, 2024

## 2024-06-13 NOTE — H&P
HISTORY AND PHYSICAL             Date: 6/13/2024        Patient Name: Jn Ross     YOB: 1955      Age:  68 y.o.      History of Present Illness   Screening     Past Medical History     Past Medical History:   Diagnosis Date    COPD (chronic obstructive pulmonary disease) (HCC)         Past Surgical History     Past Surgical History:   Procedure Laterality Date    MULTIPLE TOOTH EXTRACTIONS  2004        Medications Prior to Admission     Prior to Admission medications    Medication Sig Start Date End Date Taking? Authorizing Provider   loratadine (CLARITIN) 10 MG tablet Take 1 tablet by mouth as needed    Vannesa Unger MD   EPINEPHrine (PRIMATENE MIST) 0.125 MG/ACT AERO Inhale 2 puffs into the lungs daily  Patient not taking: Reported on 6/7/2024    Vannesa Unger MD   fluticasone-umeclidin-vilant (TRELEGY ELLIPTA) 100-62.5-25 MCG/ACT AEPB inhaler Inhale 1 puff into the lungs daily 4/9/24   Helena Ramirez MD   albuterol sulfate HFA (VENTOLIN HFA) 108 (90 Base) MCG/ACT inhaler Inhale 2 puffs into the lungs 4 times daily as needed for Wheezing  Patient not taking: Reported on 6/7/2024 4/9/24   Helena Ramirez MD   lisinopril (PRINIVIL;ZESTRIL) 5 MG tablet Take 2 tablets by mouth daily  Patient taking differently: Take 1 tablet by mouth nightly 4/9/24   Helena Ramirez MD   diphenhydrAMINE (BENADRYL ALLERGY) 25 MG capsule Take 1 capsule by mouth every 6 hours as needed for Sleep    ProviderVannesa MD   diphenhydrAMINE-APAP, sleep, (TYLENOL PM EXTRA STRENGTH PO) Take by mouth as needed    ProviderVannesa MD   ibuprofen (ADVIL;MOTRIN) 200 MG CAPS capsule Take 2 capsules by mouth every 8 hours as needed for Pain  Patient not taking: Reported on 6/7/2024 1/9/24   Helena Ramirez MD        Allergies     Patient has no known allergies.    Social History     For pertinent, see above.     Family History     Family History   Problem Relation Age of Onset    No Known

## 2024-06-13 NOTE — DISCHARGE INSTRUCTIONS
Gastrointestinal Colonoscopy/Flexible Sigmoidoscopy - Lower Exam Discharge Instructions  Call Dr. Chino for any problems or questions.    Contact the doctor’s office for follow up appointment as directed    Medication may cause drowsiness for several hours, therefore:  Do not drive or operate machinery for reminder of the day.  No alcohol today.  Do not make any important or legal decisions for 24 hours.  Do not sign any legal documents for 24 hours.    Ordinarily, you may resume regular diet and activity after exam unless otherwise specified by your physician.    Because of air put into your colon during exam, you may experience some abdominal distension, relieved by the passage of gas, for several hours.    Contact your physician if you have any of the following:  Excessive amount of bleeding - large amount when having a bowel movement.  Occasional specks of blood with bowel movement would not be unusual.  Severe abdominal pain  Fever or Chills    Postoperative Diagnosis:   Normal exam      Recommendations:   Repeat colonoscopy in 10 years

## 2024-06-14 NOTE — PROGRESS NOTES
Post op calls done. No complaints from patient, feels good after procedure. Was instructed to contact dr Chino's office for any question or concern.

## 2024-06-15 DIAGNOSIS — I10 PRIMARY HYPERTENSION: ICD-10-CM

## 2024-06-17 RX ORDER — LISINOPRIL 5 MG/1
10 TABLET ORAL DAILY
Qty: 180 TABLET | Refills: 1 | OUTPATIENT
Start: 2024-06-17

## 2024-06-22 PROBLEM — Z12.11 COLON CANCER SCREENING: Status: RESOLVED | Noted: 2024-05-23 | Resolved: 2024-06-22

## 2024-07-02 ENCOUNTER — TELEPHONE (OUTPATIENT)
Age: 69
End: 2024-07-02

## 2024-07-02 NOTE — TELEPHONE ENCOUNTER
----- Message from Tomás Olivares MD sent at 7/2/2024  6:42 AM EDT -----  Please let him know that his stress test came back abnormal with an area suggestive of blockage in the arteries of his heart and with reduced pumping ability of the heart.  We need to meet with him sooner to discuss the possibility of him getting a heart catheterization as this would be indicated with this abnormal stress test.  He needs a 15-minute follow-up visit anytime in the next couple weeks.  Thanks,  AD

## 2024-07-02 NOTE — TELEPHONE ENCOUNTER
Called and spoke to pt regarding stress test results. Informed him of abnormal results- pt states he wants to call back before scheduling follow up. States he has had this his entire life and does not want to move forward with scheduling at this time.

## 2024-07-04 DIAGNOSIS — R06.02 SOB (SHORTNESS OF BREATH): ICD-10-CM

## 2024-07-04 DIAGNOSIS — J44.9 CHRONIC OBSTRUCTIVE PULMONARY DISEASE, UNSPECIFIED COPD TYPE (HCC): ICD-10-CM

## 2024-07-05 ENCOUNTER — OFFICE VISIT (OUTPATIENT)
Age: 69
End: 2024-07-05
Payer: COMMERCIAL

## 2024-07-05 VITALS
BODY MASS INDEX: 20.44 KG/M2 | DIASTOLIC BLOOD PRESSURE: 74 MMHG | SYSTOLIC BLOOD PRESSURE: 128 MMHG | HEART RATE: 62 BPM | WEIGHT: 138 LBS | HEIGHT: 69 IN

## 2024-07-05 DIAGNOSIS — R06.09 DYSPNEA ON EXERTION: Primary | ICD-10-CM

## 2024-07-05 DIAGNOSIS — I10 PRIMARY HYPERTENSION: ICD-10-CM

## 2024-07-05 DIAGNOSIS — R94.39 ABNORMAL NUCLEAR STRESS TEST: ICD-10-CM

## 2024-07-05 DIAGNOSIS — R94.31 ABNORMAL EKG: ICD-10-CM

## 2024-07-05 DIAGNOSIS — E78.2 MIXED HYPERLIPIDEMIA: ICD-10-CM

## 2024-07-05 DIAGNOSIS — R07.2 PRECORDIAL CHEST PAIN: ICD-10-CM

## 2024-07-05 DIAGNOSIS — Z72.0 TOBACCO ABUSE: ICD-10-CM

## 2024-07-05 PROCEDURE — 99215 OFFICE O/P EST HI 40 MIN: CPT | Performed by: INTERNAL MEDICINE

## 2024-07-05 PROCEDURE — 3078F DIAST BP <80 MM HG: CPT | Performed by: INTERNAL MEDICINE

## 2024-07-05 PROCEDURE — 3074F SYST BP LT 130 MM HG: CPT | Performed by: INTERNAL MEDICINE

## 2024-07-05 PROCEDURE — 1123F ACP DISCUSS/DSCN MKR DOCD: CPT | Performed by: INTERNAL MEDICINE

## 2024-07-05 RX ORDER — FLUTICASONE FUROATE, UMECLIDINIUM BROMIDE AND VILANTEROL TRIFENATATE 100; 62.5; 25 UG/1; UG/1; UG/1
POWDER RESPIRATORY (INHALATION)
Qty: 60 EACH | Refills: 2 | OUTPATIENT
Start: 2024-07-05

## 2024-07-05 RX ORDER — SODIUM CHLORIDE 0.9 % (FLUSH) 0.9 %
5-40 SYRINGE (ML) INJECTION PRN
OUTPATIENT
Start: 2024-07-05

## 2024-07-05 RX ORDER — ASPIRIN 81 MG/1
81 TABLET ORAL DAILY
COMMUNITY

## 2024-07-05 RX ORDER — ROSUVASTATIN CALCIUM 10 MG/1
10 TABLET, COATED ORAL DAILY
Qty: 30 TABLET | Refills: 5 | Status: SHIPPED | OUTPATIENT
Start: 2024-07-05

## 2024-07-05 RX ORDER — NITROGLYCERIN 0.4 MG/1
TABLET SUBLINGUAL
Qty: 25 TABLET | Refills: 3 | Status: SHIPPED | OUTPATIENT
Start: 2024-07-05

## 2024-07-05 RX ORDER — SODIUM CHLORIDE 9 MG/ML
INJECTION, SOLUTION INTRAVENOUS PRN
OUTPATIENT
Start: 2024-07-05

## 2024-07-05 RX ORDER — SODIUM CHLORIDE 0.9 % (FLUSH) 0.9 %
5-40 SYRINGE (ML) INJECTION EVERY 12 HOURS SCHEDULED
OUTPATIENT
Start: 2024-07-05

## 2024-07-05 ASSESSMENT — ENCOUNTER SYMPTOMS
WHEEZING: 0
DOUBLE VISION: 0
HEMOPTYSIS: 0
EYE REDNESS: 0
HEMATOCHEZIA: 0
ABDOMINAL PAIN: 0
STRIDOR: 0
HEMATEMESIS: 0
HOARSE VOICE: 0

## 2024-07-05 NOTE — PROGRESS NOTES
[facility] CARDIOLOGY  [address] [place] [address], [address]  [place] [address]  PHONE: [phone]          24    NAME:  Jn Ross  : 1955  MRN: [id number]         SUBJECTIVE:   Jn Ross is a 69 y.o. male seen for a visit regarding the following:     Chief Complaint   Patient presents with    Follow-up    Irregular Heart Beat           HPI:    Cardiac problem list:  1.  Dyspnea on exertion  NST 2024-EF 33%, moderate severity small in size LV inferolateral perfusion defect, achieved 10.1 METS.  2.  Abnormal EKG-sinus rhythm, nonspecific IVCD, voltage criteria for LVH with anterolateral ST and T wave changes  3.  Hypertension  4.  COPD  5.  Smoker  6.  Hyperlipidemia    Dear Dr. Ramirez,  I saw Mr. Ross who is a pleasant 69-year-old gentleman in cardiovascular  follow up for dyspnea on exertion with an underlying abnormal EKG, hypertension, hyperlipidemia, is a smoker and has COPD.      Dyspnea on exertion/abnormal EKG-has had some dyspnea on exertion for a while but he feels that especially climbing up stairs he has gotten more short of breath.  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  No significant lower extremity edema orthopnea or PND.  Occasional wheezing, understands that he has underlying COPD.  Abnormal EKG-has nonspecific IVCD and voltage criteria for LVH with ST-T wave changes noted in the anterolateral leads that could be consistent with ischemia although he has no significant chest pain in any way suggestive of angina.  -We got him through a nuclear stress test which showed significant reversible ischemia and cardiomyopathy which is why we will get him through coronary angiography in follow-up.  He has significant risk factors including hypertension, hyperlipidemia, smoking and known coronary calcifications.  He had 1 episode of chest pain but

## 2024-07-09 ENCOUNTER — OFFICE VISIT (OUTPATIENT)
Dept: PRIMARY CARE CLINIC | Facility: CLINIC | Age: 69
End: 2024-07-09
Payer: COMMERCIAL

## 2024-07-09 VITALS
HEART RATE: 63 BPM | HEIGHT: 69 IN | SYSTOLIC BLOOD PRESSURE: 110 MMHG | RESPIRATION RATE: 16 BRPM | WEIGHT: 138 LBS | OXYGEN SATURATION: 99 % | BODY MASS INDEX: 20.44 KG/M2 | TEMPERATURE: 98.1 F | DIASTOLIC BLOOD PRESSURE: 70 MMHG

## 2024-07-09 DIAGNOSIS — Z71.3 DIETARY COUNSELING: ICD-10-CM

## 2024-07-09 DIAGNOSIS — M79.605 PAIN IN BOTH LOWER EXTREMITIES: ICD-10-CM

## 2024-07-09 DIAGNOSIS — R06.02 SOB (SHORTNESS OF BREATH): ICD-10-CM

## 2024-07-09 DIAGNOSIS — J44.9 CHRONIC OBSTRUCTIVE PULMONARY DISEASE, UNSPECIFIED COPD TYPE (HCC): Primary | ICD-10-CM

## 2024-07-09 DIAGNOSIS — I25.10 CORONARY ARTERY DISEASE DUE TO LIPID RICH PLAQUE: ICD-10-CM

## 2024-07-09 DIAGNOSIS — I10 PRIMARY HYPERTENSION: ICD-10-CM

## 2024-07-09 DIAGNOSIS — I73.9 CLAUDICATION OF LOWER EXTREMITY (HCC): ICD-10-CM

## 2024-07-09 DIAGNOSIS — Z72.0 TOBACCO ABUSE: ICD-10-CM

## 2024-07-09 DIAGNOSIS — G89.29 CHRONIC BILATERAL LOW BACK PAIN WITHOUT SCIATICA: ICD-10-CM

## 2024-07-09 DIAGNOSIS — I25.83 CORONARY ARTERY DISEASE DUE TO LIPID RICH PLAQUE: ICD-10-CM

## 2024-07-09 DIAGNOSIS — M79.604 PAIN IN BOTH LOWER EXTREMITIES: ICD-10-CM

## 2024-07-09 DIAGNOSIS — E78.5 HYPERLIPIDEMIA, UNSPECIFIED HYPERLIPIDEMIA TYPE: ICD-10-CM

## 2024-07-09 DIAGNOSIS — M54.50 CHRONIC BILATERAL LOW BACK PAIN WITHOUT SCIATICA: ICD-10-CM

## 2024-07-09 PROCEDURE — 3074F SYST BP LT 130 MM HG: CPT | Performed by: FAMILY MEDICINE

## 2024-07-09 PROCEDURE — 3078F DIAST BP <80 MM HG: CPT | Performed by: FAMILY MEDICINE

## 2024-07-09 PROCEDURE — 99214 OFFICE O/P EST MOD 30 MIN: CPT | Performed by: FAMILY MEDICINE

## 2024-07-09 PROCEDURE — 1123F ACP DISCUSS/DSCN MKR DOCD: CPT | Performed by: FAMILY MEDICINE

## 2024-07-09 RX ORDER — ALBUTEROL SULFATE 90 UG/1
2 AEROSOL, METERED RESPIRATORY (INHALATION) 4 TIMES DAILY PRN
Qty: 1 EACH | Refills: 5 | Status: SHIPPED | OUTPATIENT
Start: 2024-07-09

## 2024-07-09 RX ORDER — FLUTICASONE FUROATE, UMECLIDINIUM BROMIDE AND VILANTEROL TRIFENATATE 100; 62.5; 25 UG/1; UG/1; UG/1
1 POWDER RESPIRATORY (INHALATION) DAILY
Qty: 3 EACH | Refills: 1 | Status: SHIPPED | OUTPATIENT
Start: 2024-07-09

## 2024-07-09 RX ORDER — LISINOPRIL 5 MG/1
5 TABLET ORAL NIGHTLY
Qty: 90 TABLET | Refills: 1 | Status: SHIPPED | OUTPATIENT
Start: 2024-07-09

## 2024-07-09 SDOH — ECONOMIC STABILITY: FOOD INSECURITY: WITHIN THE PAST 12 MONTHS, YOU WORRIED THAT YOUR FOOD WOULD RUN OUT BEFORE YOU GOT MONEY TO BUY MORE.: NEVER TRUE

## 2024-07-09 SDOH — ECONOMIC STABILITY: FOOD INSECURITY: WITHIN THE PAST 12 MONTHS, THE FOOD YOU BOUGHT JUST DIDN'T LAST AND YOU DIDN'T HAVE MONEY TO GET MORE.: NEVER TRUE

## 2024-07-09 SDOH — ECONOMIC STABILITY: INCOME INSECURITY: HOW HARD IS IT FOR YOU TO PAY FOR THE VERY BASICS LIKE FOOD, HOUSING, MEDICAL CARE, AND HEATING?: NOT HARD AT ALL

## 2024-07-09 ASSESSMENT — PATIENT HEALTH QUESTIONNAIRE - PHQ9
SUM OF ALL RESPONSES TO PHQ QUESTIONS 1-9: 0
1. LITTLE INTEREST OR PLEASURE IN DOING THINGS: NOT AT ALL
2. FEELING DOWN, DEPRESSED OR HOPELESS: NOT AT ALL
SUM OF ALL RESPONSES TO PHQ QUESTIONS 1-9: 0
SUM OF ALL RESPONSES TO PHQ9 QUESTIONS 1 & 2: 0
SUM OF ALL RESPONSES TO PHQ QUESTIONS 1-9: 0
SUM OF ALL RESPONSES TO PHQ QUESTIONS 1-9: 0

## 2024-07-09 ASSESSMENT — ENCOUNTER SYMPTOMS
SHORTNESS OF BREATH: 1
BACK PAIN: 1
ALLERGIC/IMMUNOLOGIC NEGATIVE: 1
GASTROINTESTINAL NEGATIVE: 1
EYES NEGATIVE: 1

## 2024-07-09 NOTE — PROGRESS NOTES
Status:   Future     Standing Expiration Date:   7/9/2025    Magnesium     Standing Status:   Future     Standing Expiration Date:   7/9/2025    Vascular duplex lower extremity arteries bilateral     Standing Status:   Future     Standing Expiration Date:   7/9/2025        Orders Placed This Encounter   Medications    albuterol sulfate HFA (VENTOLIN HFA) 108 (90 Base) MCG/ACT inhaler     Sig: Inhale 2 puffs into the lungs 4 times daily as needed for Wheezing     Dispense:  1 each     Refill:  5    fluticasone-umeclidin-vilant (TRELEGY ELLIPTA) 100-62.5-25 MCG/ACT AEPB inhaler     Sig: Inhale 1 puff into the lungs daily     Dispense:  3 each     Refill:  1    lisinopril (PRINIVIL;ZESTRIL) 5 MG tablet     Sig: Take 1 tablet by mouth nightly     Dispense:  90 tablet     Refill:  1          No results found for any visits on 07/09/24.     Lab Results   Component Value Date     01/09/2024    K 4.7 01/09/2024     01/09/2024    CO2 30 01/09/2024    BUN 31 (H) 01/09/2024    CREATININE 1.00 01/09/2024    GLUCOSE 106 (H) 01/09/2024    CALCIUM 9.6 01/09/2024    BILITOT 0.3 01/09/2024    ALKPHOS 77 01/09/2024    AST 30 01/09/2024    ALT 53 01/09/2024    GLOB 3.4 01/09/2024     Lab Results   Component Value Date    WBC 7.9 01/09/2024    HGB 15.6 01/09/2024    HCT 48.0 01/09/2024    MCV 89.7 01/09/2024     (H) 01/09/2024     Lab Results   Component Value Date    LABA1C 5.5 01/09/2024     Lab Results   Component Value Date     01/09/2024     Lab Results   Component Value Date    CHOL 203 (H) 01/09/2024     Lab Results   Component Value Date    TRIG 178 (H) 01/09/2024     Lab Results   Component Value Date    HDL 51 01/09/2024     No components found for: \"LDLCHOLESTEROL\", \"LDLCALC\"    Lab Results   Component Value Date    VLDL 35.6 (H) 01/09/2024     Lab Results   Component Value Date    CHOLHDLRATIO 4.0 01/09/2024       Xray Result (most recent):  XR LUMBAR SPINE (2-3 VIEWS)

## 2024-07-12 NOTE — PROGRESS NOTES
Denies known Latex allergy or symptoms of Latex sensitivity.  Medications reviewed and updated.  History   Smoking Status   • Never Smoker   Smokeless Tobacco   • Never Used     Pt here for PROCEDURE: Irrigation and debridement left foot ulcer with excision of bone for osteomyelitis dos 12/29/16 follow up.   Patient pre-assessment complete for Ross scheduled for [facility], arrival time 0730. Patient verified using . Patient instructed to bring a list of all home medications on the day of procedure. NPO status reinforced. Patient informed to take a full dose aspirin 325mg  or 81 mg x 4 on the day of procedure.  Instructed they can take all other medications excluding vitamins & supplements. Patient verbalizes understanding of all instructions & denies any questions at this time.

## 2024-07-15 ENCOUNTER — HOSPITAL ENCOUNTER (OUTPATIENT)
Age: 69
Setting detail: OBSERVATION
Discharge: HOME | End: 2024-07-16
Attending: INTERNAL MEDICINE | Admitting: INTERNAL MEDICINE
Payer: COMMERCIAL

## 2024-07-15 DIAGNOSIS — I20.0 ACCELERATING ANGINA (HCC): Primary | ICD-10-CM

## 2024-07-15 DIAGNOSIS — R94.39 ABNORMAL NUCLEAR STRESS TEST: ICD-10-CM

## 2024-07-15 LAB
ACT BLD: 220 SECS (ref 70–128)
ACT BLD: 256 SECS (ref 70–128)
ANION GAP SERPL CALC-SCNC: 10 MMOL/L (ref 9–18)
BASOPHILS # BLD: 0.2 K/UL (ref 0–0.2)
BASOPHILS NFR BLD: 2 % (ref 0–2)
BUN SERPL-MCNC: 18 MG/DL (ref 8–23)
CALCIUM SERPL-MCNC: 9.1 MG/DL (ref 8.8–10.2)
CHLORIDE SERPL-SCNC: 104 MMOL/L (ref 98–107)
CO2 SERPL-SCNC: 25 MMOL/L (ref 20–28)
CREAT SERPL-MCNC: 0.92 MG/DL (ref 0.8–1.3)
DIFFERENTIAL METHOD BLD: ABNORMAL
ECHO BSA: 1.75 M2
EKG ATRIAL RATE: 46 BPM
EKG ATRIAL RATE: 49 BPM
EKG DIAGNOSIS: NORMAL
EKG DIAGNOSIS: NORMAL
EKG P AXIS: 32 DEGREES
EKG P AXIS: 70 DEGREES
EKG P-R INTERVAL: 170 MS
EKG P-R INTERVAL: 170 MS
EKG Q-T INTERVAL: 492 MS
EKG Q-T INTERVAL: 506 MS
EKG QRS DURATION: 114 MS
EKG QRS DURATION: 120 MS
EKG QTC CALCULATION (BAZETT): 442 MS
EKG QTC CALCULATION (BAZETT): 444 MS
EKG R AXIS: 82 DEGREES
EKG R AXIS: 82 DEGREES
EKG T AXIS: 100 DEGREES
EKG T AXIS: 103 DEGREES
EKG VENTRICULAR RATE: 46 BPM
EKG VENTRICULAR RATE: 49 BPM
EOSINOPHIL # BLD: 0.4 K/UL (ref 0–0.8)
EOSINOPHIL NFR BLD: 4 % (ref 0.5–7.8)
ERYTHROCYTE [DISTWIDTH] IN BLOOD BY AUTOMATED COUNT: 14.6 % (ref 11.9–14.6)
GLUCOSE SERPL-MCNC: 103 MG/DL (ref 70–99)
HCT VFR BLD AUTO: 46.7 % (ref 41.1–50.3)
HGB BLD-MCNC: 15.6 G/DL (ref 13.6–17.2)
IMM GRANULOCYTES # BLD AUTO: 0 K/UL (ref 0–0.5)
IMM GRANULOCYTES NFR BLD AUTO: 0 % (ref 0–5)
LYMPHOCYTES # BLD: 2 K/UL (ref 0.5–4.6)
LYMPHOCYTES NFR BLD: 20 % (ref 13–44)
MAGNESIUM SERPL-MCNC: 2 MG/DL (ref 1.8–2.4)
MCH RBC QN AUTO: 29.8 PG (ref 26.1–32.9)
MCHC RBC AUTO-ENTMCNC: 33.4 G/DL (ref 31.4–35)
MCV RBC AUTO: 89.3 FL (ref 82–102)
MONOCYTES # BLD: 0.7 K/UL (ref 0.1–1.3)
MONOCYTES NFR BLD: 7 % (ref 4–12)
NEUTS SEG # BLD: 6.8 K/UL (ref 1.7–8.2)
NEUTS SEG NFR BLD: 67 % (ref 43–78)
NRBC # BLD: 0 K/UL (ref 0–0.2)
PLATELET # BLD AUTO: 797 K/UL (ref 150–450)
PMV BLD AUTO: 8.9 FL (ref 9.4–12.3)
POTASSIUM SERPL-SCNC: 4.6 MMOL/L (ref 3.5–5.1)
RBC # BLD AUTO: 5.23 M/UL (ref 4.23–5.6)
SODIUM SERPL-SCNC: 139 MMOL/L (ref 136–145)
WBC # BLD AUTO: 10.2 K/UL (ref 4.3–11.1)

## 2024-07-15 PROCEDURE — 80048 BASIC METABOLIC PNL TOTAL CA: CPT

## 2024-07-15 PROCEDURE — 85347 COAGULATION TIME ACTIVATED: CPT

## 2024-07-15 PROCEDURE — C1769 GUIDE WIRE: HCPCS | Performed by: INTERNAL MEDICINE

## 2024-07-15 PROCEDURE — 93458 L HRT ARTERY/VENTRICLE ANGIO: CPT | Performed by: INTERNAL MEDICINE

## 2024-07-15 PROCEDURE — C1725 CATH, TRANSLUMIN NON-LASER: HCPCS | Performed by: INTERNAL MEDICINE

## 2024-07-15 PROCEDURE — 94761 N-INVAS EAR/PLS OXIMETRY MLT: CPT

## 2024-07-15 PROCEDURE — 93010 ELECTROCARDIOGRAM REPORT: CPT | Performed by: INTERNAL MEDICINE

## 2024-07-15 PROCEDURE — 99153 MOD SED SAME PHYS/QHP EA: CPT | Performed by: INTERNAL MEDICINE

## 2024-07-15 PROCEDURE — 2709999900 HC NON-CHARGEABLE SUPPLY: Performed by: INTERNAL MEDICINE

## 2024-07-15 PROCEDURE — 6360000004 HC RX CONTRAST MEDICATION: Performed by: INTERNAL MEDICINE

## 2024-07-15 PROCEDURE — 99152 MOD SED SAME PHYS/QHP 5/>YRS: CPT | Performed by: INTERNAL MEDICINE

## 2024-07-15 PROCEDURE — C1887 CATHETER, GUIDING: HCPCS | Performed by: INTERNAL MEDICINE

## 2024-07-15 PROCEDURE — C1874 STENT, COATED/COV W/DEL SYS: HCPCS | Performed by: INTERNAL MEDICINE

## 2024-07-15 PROCEDURE — G0378 HOSPITAL OBSERVATION PER HR: HCPCS

## 2024-07-15 PROCEDURE — 2580000003 HC RX 258: Performed by: INTERNAL MEDICINE

## 2024-07-15 PROCEDURE — 93005 ELECTROCARDIOGRAM TRACING: CPT | Performed by: INTERNAL MEDICINE

## 2024-07-15 PROCEDURE — 6370000000 HC RX 637 (ALT 250 FOR IP): Performed by: INTERNAL MEDICINE

## 2024-07-15 PROCEDURE — 83735 ASSAY OF MAGNESIUM: CPT

## 2024-07-15 PROCEDURE — 94640 AIRWAY INHALATION TREATMENT: CPT

## 2024-07-15 PROCEDURE — 6360000002 HC RX W HCPCS: Performed by: INTERNAL MEDICINE

## 2024-07-15 PROCEDURE — 85025 COMPLETE CBC W/AUTO DIFF WBC: CPT

## 2024-07-15 PROCEDURE — C9600 PERC DRUG-EL COR STENT SING: HCPCS | Performed by: INTERNAL MEDICINE

## 2024-07-15 PROCEDURE — C1894 INTRO/SHEATH, NON-LASER: HCPCS | Performed by: INTERNAL MEDICINE

## 2024-07-15 PROCEDURE — 2500000003 HC RX 250 WO HCPCS: Performed by: INTERNAL MEDICINE

## 2024-07-15 PROCEDURE — 94760 N-INVAS EAR/PLS OXIMETRY 1: CPT

## 2024-07-15 DEVICE — STENT ONYXNG27515UX ONYX 2.75X15RX
Type: IMPLANTABLE DEVICE | Status: FUNCTIONAL
Brand: ONYX FRONTIER™

## 2024-07-15 DEVICE — STENT ONYXNG25038UX ONYX 2.50X38RX
Type: IMPLANTABLE DEVICE | Status: FUNCTIONAL
Brand: ONYX FRONTIER™

## 2024-07-15 RX ORDER — CLOPIDOGREL BISULFATE 75 MG/1
75 TABLET ORAL DAILY
Status: DISCONTINUED | OUTPATIENT
Start: 2024-07-16 | End: 2024-07-16 | Stop reason: HOSPADM

## 2024-07-15 RX ORDER — ALBUTEROL SULFATE 90 UG/1
2 AEROSOL, METERED RESPIRATORY (INHALATION) 4 TIMES DAILY PRN
Status: DISCONTINUED | OUTPATIENT
Start: 2024-07-15 | End: 2024-07-15 | Stop reason: CLARIF

## 2024-07-15 RX ORDER — NITROGLYCERIN 20 MG/100ML
INJECTION INTRAVENOUS PRN
Status: DISCONTINUED | OUTPATIENT
Start: 2024-07-15 | End: 2024-07-15 | Stop reason: HOSPADM

## 2024-07-15 RX ORDER — MIDAZOLAM HYDROCHLORIDE 1 MG/ML
INJECTION INTRAMUSCULAR; INTRAVENOUS PRN
Status: DISCONTINUED | OUTPATIENT
Start: 2024-07-15 | End: 2024-07-15 | Stop reason: HOSPADM

## 2024-07-15 RX ORDER — LISINOPRIL 5 MG/1
5 TABLET ORAL NIGHTLY
Status: DISCONTINUED | OUTPATIENT
Start: 2024-07-15 | End: 2024-07-16 | Stop reason: HOSPADM

## 2024-07-15 RX ORDER — HEPARIN SODIUM 200 [USP'U]/100ML
INJECTION, SOLUTION INTRAVENOUS CONTINUOUS PRN
Status: COMPLETED | OUTPATIENT
Start: 2024-07-15 | End: 2024-07-15

## 2024-07-15 RX ORDER — HYDROCODONE BITARTRATE AND ACETAMINOPHEN 5; 325 MG/1; MG/1
1 TABLET ORAL EVERY 4 HOURS PRN
Status: DISCONTINUED | OUTPATIENT
Start: 2024-07-15 | End: 2024-07-16 | Stop reason: HOSPADM

## 2024-07-15 RX ORDER — ALBUTEROL SULFATE 2.5 MG/3ML
2.5 SOLUTION RESPIRATORY (INHALATION) EVERY 4 HOURS PRN
Status: DISCONTINUED | OUTPATIENT
Start: 2024-07-15 | End: 2024-07-16 | Stop reason: HOSPADM

## 2024-07-15 RX ORDER — ASPIRIN 81 MG/1
81 TABLET, CHEWABLE ORAL DAILY
Status: DISCONTINUED | OUTPATIENT
Start: 2024-07-16 | End: 2024-07-16 | Stop reason: HOSPADM

## 2024-07-15 RX ORDER — SODIUM CHLORIDE 9 MG/ML
INJECTION, SOLUTION INTRAVENOUS PRN
Status: DISCONTINUED | OUTPATIENT
Start: 2024-07-15 | End: 2024-07-16 | Stop reason: HOSPADM

## 2024-07-15 RX ORDER — ROSUVASTATIN CALCIUM 20 MG/1
20 TABLET, COATED ORAL NIGHTLY
Status: DISCONTINUED | OUTPATIENT
Start: 2024-07-15 | End: 2024-07-16 | Stop reason: HOSPADM

## 2024-07-15 RX ORDER — LIDOCAINE HYDROCHLORIDE 10 MG/ML
INJECTION, SOLUTION INFILTRATION; PERINEURAL PRN
Status: DISCONTINUED | OUTPATIENT
Start: 2024-07-15 | End: 2024-07-15 | Stop reason: HOSPADM

## 2024-07-15 RX ORDER — ARFORMOTEROL TARTRATE 15 UG/2ML
15 SOLUTION RESPIRATORY (INHALATION)
Status: DISCONTINUED | OUTPATIENT
Start: 2024-07-15 | End: 2024-07-15

## 2024-07-15 RX ORDER — ACETAMINOPHEN 325 MG/1
650 TABLET ORAL EVERY 4 HOURS PRN
Status: DISCONTINUED | OUTPATIENT
Start: 2024-07-15 | End: 2024-07-16 | Stop reason: HOSPADM

## 2024-07-15 RX ORDER — SODIUM CHLORIDE 9 MG/ML
INJECTION, SOLUTION INTRAVENOUS CONTINUOUS
Status: ACTIVE | OUTPATIENT
Start: 2024-07-15 | End: 2024-07-15

## 2024-07-15 RX ORDER — ASPIRIN 81 MG/1
324 TABLET, CHEWABLE ORAL ONCE
Status: DISCONTINUED | OUTPATIENT
Start: 2024-07-15 | End: 2024-07-16 | Stop reason: HOSPADM

## 2024-07-15 RX ORDER — HEPARIN SODIUM 10000 [USP'U]/ML
INJECTION, SOLUTION INTRAVENOUS; SUBCUTANEOUS PRN
Status: DISCONTINUED | OUTPATIENT
Start: 2024-07-15 | End: 2024-07-15 | Stop reason: HOSPADM

## 2024-07-15 RX ORDER — HYDROCODONE BITARTRATE AND ACETAMINOPHEN 5; 325 MG/1; MG/1
2 TABLET ORAL EVERY 4 HOURS PRN
Status: DISCONTINUED | OUTPATIENT
Start: 2024-07-15 | End: 2024-07-16 | Stop reason: HOSPADM

## 2024-07-15 RX ORDER — BUDESONIDE 0.5 MG/2ML
0.5 INHALANT ORAL
Status: DISCONTINUED | OUTPATIENT
Start: 2024-07-15 | End: 2024-07-15

## 2024-07-15 RX ORDER — ASPIRIN 81 MG/1
81 TABLET ORAL DAILY
Status: DISCONTINUED | OUTPATIENT
Start: 2024-07-15 | End: 2024-07-15

## 2024-07-15 RX ORDER — SODIUM CHLORIDE 0.9 % (FLUSH) 0.9 %
5-40 SYRINGE (ML) INJECTION PRN
Status: DISCONTINUED | OUTPATIENT
Start: 2024-07-15 | End: 2024-07-16 | Stop reason: HOSPADM

## 2024-07-15 RX ORDER — SODIUM CHLORIDE 9 MG/ML
INJECTION, SOLUTION INTRAVENOUS CONTINUOUS
Status: DISCONTINUED | OUTPATIENT
Start: 2024-07-15 | End: 2024-07-16

## 2024-07-15 RX ORDER — BUDESONIDE AND FORMOTEROL FUMARATE DIHYDRATE 80; 4.5 UG/1; UG/1
2 AEROSOL RESPIRATORY (INHALATION)
Status: DISCONTINUED | OUTPATIENT
Start: 2024-07-15 | End: 2024-07-15 | Stop reason: CLARIF

## 2024-07-15 RX ORDER — SODIUM CHLORIDE 0.9 % (FLUSH) 0.9 %
5-40 SYRINGE (ML) INJECTION EVERY 12 HOURS SCHEDULED
Status: DISCONTINUED | OUTPATIENT
Start: 2024-07-15 | End: 2024-07-16 | Stop reason: HOSPADM

## 2024-07-15 RX ADMIN — ROSUVASTATIN CALCIUM 20 MG: 20 TABLET, COATED ORAL at 19:40

## 2024-07-15 RX ADMIN — LISINOPRIL 5 MG: 5 TABLET ORAL at 19:40

## 2024-07-15 RX ADMIN — IPRATROPIUM BROMIDE 0.5 MG: 0.5 SOLUTION RESPIRATORY (INHALATION) at 11:39

## 2024-07-15 RX ADMIN — SODIUM CHLORIDE: 9 INJECTION, SOLUTION INTRAVENOUS at 08:02

## 2024-07-15 RX ADMIN — BUDESONIDE 500 MCG: 0.5 INHALANT RESPIRATORY (INHALATION) at 11:39

## 2024-07-15 RX ADMIN — SODIUM CHLORIDE, PRESERVATIVE FREE 10 ML: 5 INJECTION INTRAVENOUS at 19:41

## 2024-07-15 NOTE — PROGRESS NOTES
TRANSFER - IN REPORT:    Verbal report received from Shannon GOLDSMITH on Jn Ross  being received from cath lab for routine progression of patient care      Report consisted of patient's Situation, Background, Assessment and   Recommendations(SBAR).     Information from the following report(s) Nurse Handoff Report was reviewed with the receiving nurse.    Opportunity for questions and clarification was provided.      Assessment completed upon patient's arrival to unit and care assumed.

## 2024-07-15 NOTE — PROGRESS NOTES
TRANSFER - OUT REPORT:    Verbal report given to Renu CHAPPELL RN, RN on Jn Ross  being transferred to [facility] for routine progression of patient care       Report consisted of patient’s Situation, Background, Assessment and Recommendations(SBAR).     Information from the following report(s) Procedure Summary was reviewed with the receiving nurse.    Opportunity for questions and clarification was provided.

## 2024-07-15 NOTE — PROGRESS NOTES
Radial compression band removed at 1740 after slowly reducing air from 12 cc to zero as per hospital protocol.  No bleeding or hematoma noted. 2 x 2 gauze with tegaderm placed over puncture site. The affected extremity is warm and dry to the touch. Frequent vital signs printed and placed on bedside chart.  Patient instructed to call if any bleeding noted on gauze.  Patient verbalized understanding the nursing instructions.

## 2024-07-15 NOTE — CARE COORDINATION
Patient hospitalized with abnormal stress test. [facility] with PCI to CX 7/15.   CM scanned medical record for transition of care needs. CM will follow and remain available for consult.  Transition of care to return home with spouse.     07/15/24 5134   Service Assessment   Patient Orientation Alert and Oriented   Cognition Alert   History Provided By Medical Record   Primary Caregiver Self   Accompanied By/Relationship Unknown   Support Systems Spouse/Significant Other   Patient's Healthcare Decision Maker is: Legal Next of Kin   PCP Verified by CM Yes   Last Visit to PCP Within last 3 months   Prior Functional Level Independent in ADLs/IADLs   Current Functional Level Independent in ADLs/IADLs   Can patient return to prior living arrangement Yes   Ability to make needs known: Good   Family able to assist with home care needs: Yes   Would you like for me to discuss the discharge plan with any other family members/significant others, and if so, who? Yes  (Spouse)   Financial Resources Other (Comment)  (Commercial)   Community Resources None   CM/SW Referral Other (see comment)  (None)   Social/Functional History   Lives With Spouse   Type of Home Apartment   Home Equipment None   Receives Help From Family   ADL Assistance Independent   Homemaking Assistance Independent   Ambulation Assistance Independent   Transfer Assistance Independent   Active  Yes   Mode of Transportation Car   Type of Occupation Real estate   Discharge Planning   Type of Residence Apartment   Living Arrangements Spouse/Significant Other   Current Services Prior To Admission None   Potential Assistance Needed N/A   DME Ordered? No   Potential Assistance Purchasing Medications No   Type of Home Care Services None   Patient expects to be discharged to: Apartment   Services At/After Discharge   Transition of Care Consult (CM Consult) Discharge Planning   Services At/After Discharge None    Resource Information Provided? No   Mode of

## 2024-07-15 NOTE — PROGRESS NOTES
TRANSFER - OUT REPORT:    [facility] with Dr. Juan  Access: right radial access  2 stents to circumflex  Closure right radial band 12 ml    No bleeding or hematoma site soft    MAR  15 000 units heparin  5 mg Versed  50 mcg Fentanyl  600 mg Plavix    Verbal report given to RUPAL Rojas on Jn Ross  being transferred to [facility] for routine progression of patient care       Report consisted of patient's Situation, Background, Assessment and Recommendations(SBAR).     Information from the following report(s) Nurse Handoff Report and MAR was reviewed with the receiving nurse.    Opportunity for questions and clarification was provided.      Patient transported with:  Registered Nurse

## 2024-07-15 NOTE — PROGRESS NOTES
Pt transported to room 334. R radial site assessed with primary RN Renu. Site clean dry and intact with no bleeding or swelling.

## 2024-07-15 NOTE — FLOWSHEET NOTE
07/15/24 1120   Dual Clinician Skin Assessment   Dual Skin Assessment (4 Eyes) WDL   Second Clinical  (First and Last Name) RUPAL Blum   Skin Integumentary    Skin Integumentary (WDL) X   Skin Color Ecchymosis (comment)   Skin Condition/Temp Warm;Dry   Skin Integrity Ecchymosis;Scars (comment);Incision (see LDA)   Location right radial cath site     4 Eyes Skin Assessment     NAME:  Jn Ross  YOB: 1955  MEDICAL RECORD NUMBER:  [id number]    The patient is being assessed for  Admission    I agree that at least one RN has performed a thorough Head to Toe Skin Assessment on the patient. ALL assessment sites listed below have been assessed.      Areas assessed by both nurses:    Head, Face, Ears, Shoulders, Back, Chest, Arms, Elbows, Hands, Sacrum. Buttock, Coccyx, Ischium, and Legs. Feet and Heels        Does the Patient have a Wound? No noted wound(s)       Juarez Prevention initiated by RN: No  Wound Care Orders initiated by RN: No    Pressure Injury (Stage 3,4, Unstageable, DTI, NWPT, and Complex wounds) if present, place Wound referral order by RN under : No    New Ostomies, if present place, Ostomy referral order under : No     Nurse 1 eSignature: Electronically signed by Renu Connolly RN on 7/15/24 at 4:02 PM EDT    **SHARE this note so that the co-signing nurse can place an eSignature**    Nurse 2 eSignature: {Esignature:[id number]}

## 2024-07-16 VITALS
HEART RATE: 61 BPM | HEIGHT: 69 IN | SYSTOLIC BLOOD PRESSURE: 113 MMHG | OXYGEN SATURATION: 97 % | BODY MASS INDEX: 20.44 KG/M2 | WEIGHT: 138 LBS | TEMPERATURE: 97.5 F | RESPIRATION RATE: 18 BRPM | DIASTOLIC BLOOD PRESSURE: 67 MMHG

## 2024-07-16 PROBLEM — I20.0 ACCELERATING ANGINA (HCC): Status: RESOLVED | Noted: 2024-07-15 | Resolved: 2024-07-16

## 2024-07-16 LAB
ANION GAP SERPL CALC-SCNC: 9 MMOL/L (ref 9–18)
BUN SERPL-MCNC: 17 MG/DL (ref 8–23)
CALCIUM SERPL-MCNC: 8.7 MG/DL (ref 8.8–10.2)
CHLORIDE SERPL-SCNC: 105 MMOL/L (ref 98–107)
CHOLEST SERPL-MCNC: 105 MG/DL (ref 0–200)
CO2 SERPL-SCNC: 24 MMOL/L (ref 20–28)
CREAT SERPL-MCNC: 0.81 MG/DL (ref 0.8–1.3)
ERYTHROCYTE [DISTWIDTH] IN BLOOD BY AUTOMATED COUNT: 14.4 % (ref 11.9–14.6)
GLUCOSE SERPL-MCNC: 97 MG/DL (ref 70–99)
HCT VFR BLD AUTO: 42.1 % (ref 41.1–50.3)
HDLC SERPL-MCNC: 54 MG/DL (ref 40–60)
HDLC SERPL: 1.9 (ref 0–5)
HGB BLD-MCNC: 14 G/DL (ref 13.6–17.2)
LDLC SERPL CALC-MCNC: 37 MG/DL (ref 0–100)
MCH RBC QN AUTO: 29.5 PG (ref 26.1–32.9)
MCHC RBC AUTO-ENTMCNC: 33.3 G/DL (ref 31.4–35)
MCV RBC AUTO: 88.6 FL (ref 82–102)
NRBC # BLD: 0 K/UL (ref 0–0.2)
PLATELET # BLD AUTO: 699 K/UL (ref 150–450)
PMV BLD AUTO: 8.8 FL (ref 9.4–12.3)
POTASSIUM SERPL-SCNC: 4 MMOL/L (ref 3.5–5.1)
RBC # BLD AUTO: 4.75 M/UL (ref 4.23–5.6)
SODIUM SERPL-SCNC: 138 MMOL/L (ref 136–145)
TRIGL SERPL-MCNC: 69 MG/DL (ref 0–150)
VLDLC SERPL CALC-MCNC: 14 MG/DL (ref 6–23)
WBC # BLD AUTO: 10.1 K/UL (ref 4.3–11.1)

## 2024-07-16 PROCEDURE — 6370000000 HC RX 637 (ALT 250 FOR IP): Performed by: INTERNAL MEDICINE

## 2024-07-16 PROCEDURE — G0378 HOSPITAL OBSERVATION PER HR: HCPCS

## 2024-07-16 PROCEDURE — 80061 LIPID PANEL: CPT

## 2024-07-16 PROCEDURE — 80048 BASIC METABOLIC PNL TOTAL CA: CPT

## 2024-07-16 PROCEDURE — 85027 COMPLETE CBC AUTOMATED: CPT

## 2024-07-16 PROCEDURE — 36415 COLL VENOUS BLD VENIPUNCTURE: CPT

## 2024-07-16 RX ORDER — ROSUVASTATIN CALCIUM 20 MG/1
20 TABLET, COATED ORAL NIGHTLY
Qty: 30 TABLET | Refills: 3 | Status: SHIPPED | OUTPATIENT
Start: 2024-07-16

## 2024-07-16 RX ORDER — CLOPIDOGREL BISULFATE 75 MG/1
75 TABLET ORAL DAILY
Qty: 90 TABLET | Refills: 3 | Status: SHIPPED | OUTPATIENT
Start: 2024-07-16

## 2024-07-16 RX ADMIN — ASPIRIN 81 MG 81 MG: 81 TABLET ORAL at 08:03

## 2024-07-16 RX ADMIN — CLOPIDOGREL BISULFATE 75 MG: 75 TABLET ORAL at 08:04

## 2024-07-16 NOTE — PROGRESS NOTES
[facility] TELEMETRY  [address] [place] [address]  Phone: [phone]    To Whom it May Concern:  Jn Ross was seen and treated in our facility beginning 07/15/2024 until 07/16/2024. Patient will have an 8 lb weight restriction for 7 days due to having procedure. If available, patient can resume light duty in 2 days.       Sincerely,        Signature:____________________________

## 2024-07-16 NOTE — DISCHARGE SUMMARY
Pain  Qty: 30 capsule, Refills: 1    Associated Diagnoses: Ocular migraine; Chronic bilateral low back pain without sciatica; Cervicalgia; Chronic pain of both shoulders           STOP taking these medications       diphenhydrAMINE (BENADRYL ALLERGY) 25 MG capsule Comments:   Reason for Stopping:             MOSES Otto CNP  07/16/24  7:36 AM        [facility]     7/16/2024     7:42 AM    I have personally seen and examined Jn Ross with Miguel Mathur NP. I confirm findings and plan as outlined in discharge summary. Have reviewed and discussed discharge medication, diet and instructions with patient. Patient to follow up as directed and contact our office with any questions.       Christiano Juan MD

## 2024-07-16 NOTE — PROGRESS NOTES
Discharge instructions reviewed with patient and wife. Prescriptions given for plavix and med info sheets provided for all new medications. Opportunity for questions provided. Patient and wife voiced understanding of all discharge instructions.

## 2024-07-16 NOTE — PROGRESS NOTES
[facility]  [address] [place] [address]  Phone: [phone]      To Whom it May Concern:    Jn Ross was seen and treated in our facility beginning 07/15/2024 until 07/16/2024. Patient will have an 8 lb weight restriction for 7 days due to having procedure and interventions. If available, patient can resume light duty in 2 days.       Sincerely,        Signature:____________________________

## 2024-07-22 ENCOUNTER — OFFICE VISIT (OUTPATIENT)
Dept: PRIMARY CARE CLINIC | Facility: CLINIC | Age: 69
End: 2024-07-22
Payer: COMMERCIAL

## 2024-07-22 VITALS
RESPIRATION RATE: 16 BRPM | TEMPERATURE: 98.2 F | DIASTOLIC BLOOD PRESSURE: 70 MMHG | SYSTOLIC BLOOD PRESSURE: 110 MMHG | BODY MASS INDEX: 20.59 KG/M2 | OXYGEN SATURATION: 96 % | HEART RATE: 61 BPM | HEIGHT: 69 IN | WEIGHT: 139 LBS

## 2024-07-22 DIAGNOSIS — Z72.0 TOBACCO ABUSE: ICD-10-CM

## 2024-07-22 DIAGNOSIS — I73.9 CLAUDICATION OF LOWER EXTREMITY (HCC): ICD-10-CM

## 2024-07-22 DIAGNOSIS — I25.83 CORONARY ARTERY DISEASE DUE TO LIPID RICH PLAQUE: ICD-10-CM

## 2024-07-22 DIAGNOSIS — M79.605 PAIN IN BOTH LOWER EXTREMITIES: ICD-10-CM

## 2024-07-22 DIAGNOSIS — E78.5 HYPERLIPIDEMIA, UNSPECIFIED HYPERLIPIDEMIA TYPE: ICD-10-CM

## 2024-07-22 DIAGNOSIS — M79.604 PAIN IN BOTH LOWER EXTREMITIES: ICD-10-CM

## 2024-07-22 DIAGNOSIS — I10 PRIMARY HYPERTENSION: ICD-10-CM

## 2024-07-22 DIAGNOSIS — Z71.3 DIETARY COUNSELING: ICD-10-CM

## 2024-07-22 DIAGNOSIS — I25.10 CORONARY ARTERY DISEASE DUE TO LIPID RICH PLAQUE: ICD-10-CM

## 2024-07-22 DIAGNOSIS — Z09 HOSPITAL DISCHARGE FOLLOW-UP: Primary | ICD-10-CM

## 2024-07-22 DIAGNOSIS — R06.02 SOB (SHORTNESS OF BREATH): ICD-10-CM

## 2024-07-22 DIAGNOSIS — J44.9 CHRONIC OBSTRUCTIVE PULMONARY DISEASE, UNSPECIFIED COPD TYPE (HCC): ICD-10-CM

## 2024-07-22 PROCEDURE — 3074F SYST BP LT 130 MM HG: CPT | Performed by: FAMILY MEDICINE

## 2024-07-22 PROCEDURE — 1123F ACP DISCUSS/DSCN MKR DOCD: CPT | Performed by: FAMILY MEDICINE

## 2024-07-22 PROCEDURE — 99214 OFFICE O/P EST MOD 30 MIN: CPT | Performed by: FAMILY MEDICINE

## 2024-07-22 PROCEDURE — 3078F DIAST BP <80 MM HG: CPT | Performed by: FAMILY MEDICINE

## 2024-07-22 RX ORDER — FLUTICASONE FUROATE, UMECLIDINIUM BROMIDE AND VILANTEROL TRIFENATATE 100; 62.5; 25 UG/1; UG/1; UG/1
1 POWDER RESPIRATORY (INHALATION) DAILY
Qty: 3 EACH | Refills: 1 | Status: SHIPPED | OUTPATIENT
Start: 2024-07-22

## 2024-07-22 ASSESSMENT — ENCOUNTER SYMPTOMS
ALLERGIC/IMMUNOLOGIC NEGATIVE: 1
EYES NEGATIVE: 1
GASTROINTESTINAL NEGATIVE: 1
BACK PAIN: 1
SHORTNESS OF BREATH: 1

## 2024-07-22 NOTE — PROGRESS NOTES
salt diet. The patient is instructed to advance activities as tolerated to the limit of fatigue or shortness of breath. The patient is instructed to avoid all heavy lifting for 3-5 days. The patient is instructed to watch the cath site for bleeding/oozing; if seen, the patient is instructed to apply firm pressure with a clean cloth and call [facility] Cardiology at [phone]. The patient is instructed to watch for signs of infection which include: increasing area of redness, fever/hot to touch or purulent drainage at the catheterization site. The patient is instructed not to soak in a bathtub for 7-10 days, but is cleared to shower. The patient is instructed to call the office or return to the ER for immediate evaluation for any shortness of breath or chest pain not relieved by NTG.     f/u on multiple chronic medical conditions-good compliance with medications--pt here to get routeine labs and need refill on meds.no cardiopulmonary symptoms  CAD-pt scheduled for stenting in July 2024-continues to smoke tobacco  Hypertension-meed treatment  Hyperlipidemia-diet controlled  COPD=sob on and off few months--interested in inhalers-denies CP/palpitations--interested in cardiac work up  CXR =COPD  CT chest last years -no lung nodules-need recheck  Chronic lowback pain/cervicalgia/bilateral shoulder pain -arthritis-pt not interested in xray or ortho consult-willing to consider PT  Migraine headache-more on left side--worse and more frequently-seen eye MD-no acute cause found-MRI brain-no acute changes--no N/V/D/C/ABD PAIN/photosensitivity  Skin lesions-lower extremities-referred to  derm-family hx of skin cancer  Tobacco use-daily    Pt c.of bilateral lower extremity pain/cramps on and off-more on left side-claudication-better after walking--no fall/injury or local redness    Patient Active Problem List   Diagnosis    Ocular migraine    H/O seasonal allergies    BPPV (benign paroxysmal positional vertigo)    Attention deficit

## 2024-07-30 ENCOUNTER — PATIENT MESSAGE (OUTPATIENT)
Dept: PRIMARY CARE CLINIC | Facility: CLINIC | Age: 69
End: 2024-07-30

## 2024-08-01 ENCOUNTER — OFFICE VISIT (OUTPATIENT)
Dept: PRIMARY CARE CLINIC | Facility: CLINIC | Age: 69
End: 2024-08-01
Payer: COMMERCIAL

## 2024-08-01 ENCOUNTER — TELEPHONE (OUTPATIENT)
Age: 69
End: 2024-08-01

## 2024-08-01 VITALS
DIASTOLIC BLOOD PRESSURE: 60 MMHG | TEMPERATURE: 97.2 F | RESPIRATION RATE: 16 BRPM | HEART RATE: 55 BPM | BODY MASS INDEX: 20.73 KG/M2 | OXYGEN SATURATION: 100 % | WEIGHT: 140 LBS | SYSTOLIC BLOOD PRESSURE: 122 MMHG | HEIGHT: 69 IN

## 2024-08-01 DIAGNOSIS — I10 PRIMARY HYPERTENSION: ICD-10-CM

## 2024-08-01 DIAGNOSIS — I25.83 CORONARY ARTERY DISEASE DUE TO LIPID RICH PLAQUE: ICD-10-CM

## 2024-08-01 DIAGNOSIS — I25.10 CORONARY ARTERY DISEASE DUE TO LIPID RICH PLAQUE: ICD-10-CM

## 2024-08-01 DIAGNOSIS — J44.9 CHRONIC OBSTRUCTIVE PULMONARY DISEASE, UNSPECIFIED COPD TYPE (HCC): ICD-10-CM

## 2024-08-01 DIAGNOSIS — Z72.0 TOBACCO ABUSE: ICD-10-CM

## 2024-08-01 DIAGNOSIS — E78.5 HYPERLIPIDEMIA, UNSPECIFIED HYPERLIPIDEMIA TYPE: ICD-10-CM

## 2024-08-01 DIAGNOSIS — R68.84 JAW PAIN, NON-TMJ: Primary | ICD-10-CM

## 2024-08-01 PROCEDURE — 1123F ACP DISCUSS/DSCN MKR DOCD: CPT | Performed by: FAMILY MEDICINE

## 2024-08-01 PROCEDURE — 3074F SYST BP LT 130 MM HG: CPT | Performed by: FAMILY MEDICINE

## 2024-08-01 PROCEDURE — 99214 OFFICE O/P EST MOD 30 MIN: CPT | Performed by: FAMILY MEDICINE

## 2024-08-01 PROCEDURE — 3078F DIAST BP <80 MM HG: CPT | Performed by: FAMILY MEDICINE

## 2024-08-01 ASSESSMENT — PATIENT HEALTH QUESTIONNAIRE - PHQ9
SUM OF ALL RESPONSES TO PHQ QUESTIONS 1-9: 0
2. FEELING DOWN, DEPRESSED OR HOPELESS: NOT AT ALL
SUM OF ALL RESPONSES TO PHQ QUESTIONS 1-9: 0
SUM OF ALL RESPONSES TO PHQ9 QUESTIONS 1 & 2: 0
1. LITTLE INTEREST OR PLEASURE IN DOING THINGS: NOT AT ALL

## 2024-08-01 NOTE — TELEPHONE ENCOUNTER
From: Jn Ross  To: Dr. Helena Ramirez  Sent: 7/30/2024 11:37 AM EDT  Subject: Recent stent placement, possible complications    I'm experiencing a few odd symptoms and am wondering if I should come in. I have pain in my lower left jaw, nausea and just general fatigue. I don't want to go to an urgent care and am wondering if there is any advice you can give me.

## 2024-08-01 NOTE — TELEPHONE ENCOUNTER
Spoke with Trang with Dr. Ramirez, who is seeing patient, and states patient has had jaw pain since 7/15/24 CARINA placement. Dr. Villanueva asks to speak directly with provider. Anneliese Mortensen on Pod A of above. She states she will ask physician to  [address] to speak with Dr. Ramirez.

## 2024-08-10 PROBLEM — R68.84 JAW PAIN, NON-TMJ: Status: ACTIVE | Noted: 2024-08-10

## 2024-08-10 RX ORDER — ASPIRIN 81 MG/1
81 TABLET ORAL DAILY
Qty: 30 TABLET | Refills: 11 | Status: SHIPPED | OUTPATIENT
Start: 2024-08-10

## 2024-08-10 ASSESSMENT — ENCOUNTER SYMPTOMS
EYES NEGATIVE: 1
BACK PAIN: 1
SHORTNESS OF BREATH: 1
GASTROINTESTINAL NEGATIVE: 1
ALLERGIC/IMMUNOLOGIC NEGATIVE: 1

## 2024-08-20 ENCOUNTER — OFFICE VISIT (OUTPATIENT)
Age: 69
End: 2024-08-20

## 2024-08-20 VITALS
WEIGHT: 141 LBS | SYSTOLIC BLOOD PRESSURE: 108 MMHG | BODY MASS INDEX: 20.88 KG/M2 | HEART RATE: 66 BPM | DIASTOLIC BLOOD PRESSURE: 60 MMHG | HEIGHT: 69 IN

## 2024-08-20 DIAGNOSIS — I25.10 CORONARY ARTERY DISEASE INVOLVING NATIVE CORONARY ARTERY OF NATIVE HEART WITHOUT ANGINA PECTORIS: Primary | ICD-10-CM

## 2024-08-20 DIAGNOSIS — E78.2 MIXED HYPERLIPIDEMIA: ICD-10-CM

## 2024-08-20 DIAGNOSIS — R06.09 DYSPNEA ON EXERTION: ICD-10-CM

## 2024-08-20 DIAGNOSIS — I10 PRIMARY HYPERTENSION: ICD-10-CM

## 2024-08-20 PROCEDURE — 1123F ACP DISCUSS/DSCN MKR DOCD: CPT | Performed by: INTERNAL MEDICINE

## 2024-08-20 PROCEDURE — 3078F DIAST BP <80 MM HG: CPT | Performed by: INTERNAL MEDICINE

## 2024-08-20 PROCEDURE — 99214 OFFICE O/P EST MOD 30 MIN: CPT | Performed by: INTERNAL MEDICINE

## 2024-08-20 PROCEDURE — 3074F SYST BP LT 130 MM HG: CPT | Performed by: INTERNAL MEDICINE

## 2024-08-20 ASSESSMENT — ENCOUNTER SYMPTOMS
HEMATOCHEZIA: 0
HOARSE VOICE: 0
STRIDOR: 0
WHEEZING: 0
EYE REDNESS: 0
HEMATEMESIS: 0
ABDOMINAL PAIN: 0
HEMOPTYSIS: 0
DOUBLE VISION: 0

## 2024-08-20 NOTE — PROGRESS NOTES
[facility] CARDIOLOGY  [address] [place] [address], [address]  [place] [address]  PHONE: [phone]          24    NAME:  Jn Ross  : 1955  MRN: [id number]         SUBJECTIVE:   Jn Ross is a 69 y.o. male seen for a visit regarding the following:     Chief Complaint   Patient presents with    1 Month Follow-Up     Dyspnea on exertion           HPI:    Cardiac problem list:  1.  Dyspnea on exertion   Echo 2024-EF reported to be 40% with mild global hypokinesis, diastolic dysfunction, mild MR-  Coronary angiography-7/15/2024--nonobstructive LAD and RCA disease, mid circumflex with significant stenosis s/p 2.5 x 38 mm and 2.75 x 15 mm resolute drug-eluting stents postdilated to 3.0 cm  NST 2024-EF 33%, moderate severity small in size LV inferolateral perfusion defect, achieved 10.1 METS.  2.  Abnormal EKG-sinus rhythm, nonspecific IVCD, voltage criteria for LVH with anterolateral ST and T wave changes  3.  Hypertension  4.  COPD  5.  Smoker  6.  Hyperlipidemia    Dear Dr. Ramirez,  I saw Mr. Ross who is a pleasant 69-year-old gentleman in cardiovascular  follow up for coronary artery disease s/p PCI to the circumflex coronary artery-2024, dyspnea on exertion with an underlying abnormal EKG, hypertension, hyperlipidemia, is a smoker and has COPD.      Dyspnea on exertion/CAD-presented as dyspnea on exertion-We got him through a nuclear stress test which showed significant reversible ischemia and cardiomyopathy which is why we got him through coronary angiography which showed minimal disease in the LAD and RCA but subtotally occluded proximal to mid circumflex status post 2 drug-eluting stents placed in 2024.  No further episodes of chest pain like he had previously.  Remains active at this time and feels that his energy levels have improved.  No significant palpitations.  No presyncope or syncope.    Cardiomyopathy-borderline low LV function noted on echo although improved

## 2024-08-21 PROBLEM — Z09 HOSPITAL DISCHARGE FOLLOW-UP: Status: RESOLVED | Noted: 2024-07-22 | Resolved: 2024-08-21

## 2024-09-20 ENCOUNTER — TELEPHONE (OUTPATIENT)
Dept: PRIMARY CARE CLINIC | Facility: CLINIC | Age: 69
End: 2024-09-20

## 2024-09-20 PROBLEM — J96.02 ACUTE RESPIRATORY FAILURE WITH HYPOXIA AND HYPERCAPNIA (HCC): Status: RESOLVED | Noted: 2018-04-09 | Resolved: 2024-09-20

## 2024-09-20 PROBLEM — J96.01 ACUTE RESPIRATORY FAILURE WITH HYPOXIA AND HYPERCAPNIA (HCC): Status: RESOLVED | Noted: 2018-04-09 | Resolved: 2024-09-20

## 2024-11-04 ENCOUNTER — TELEPHONE (OUTPATIENT)
Dept: PRIMARY CARE CLINIC | Facility: CLINIC | Age: 69
End: 2024-11-04

## 2024-11-04 DIAGNOSIS — J44.9 CHRONIC OBSTRUCTIVE PULMONARY DISEASE, UNSPECIFIED COPD TYPE (HCC): ICD-10-CM

## 2024-11-04 DIAGNOSIS — R06.02 SOB (SHORTNESS OF BREATH): ICD-10-CM

## 2024-11-04 RX ORDER — FLUTICASONE FUROATE, UMECLIDINIUM BROMIDE AND VILANTEROL TRIFENATATE 100; 62.5; 25 UG/1; UG/1; UG/1
1 POWDER RESPIRATORY (INHALATION) DAILY
Qty: 3 EACH | Refills: 1 | Status: SHIPPED | OUTPATIENT
Start: 2024-11-04

## 2024-11-04 NOTE — TELEPHONE ENCOUNTER
Pt requesting medication refill on Rosuvastatin and trelegy, please call pt back, thank you.  Please sen to [facility] [address]Out of medication for 3 weeks)

## 2025-01-20 SDOH — ECONOMIC STABILITY: FOOD INSECURITY: WITHIN THE PAST 12 MONTHS, THE FOOD YOU BOUGHT JUST DIDN'T LAST AND YOU DIDN'T HAVE MONEY TO GET MORE.: NEVER TRUE

## 2025-01-20 SDOH — ECONOMIC STABILITY: INCOME INSECURITY: IN THE LAST 12 MONTHS, WAS THERE A TIME WHEN YOU WERE NOT ABLE TO PAY THE MORTGAGE OR RENT ON TIME?: NO

## 2025-01-20 SDOH — ECONOMIC STABILITY: FOOD INSECURITY: WITHIN THE PAST 12 MONTHS, YOU WORRIED THAT YOUR FOOD WOULD RUN OUT BEFORE YOU GOT MONEY TO BUY MORE.: NEVER TRUE

## 2025-01-20 SDOH — ECONOMIC STABILITY: TRANSPORTATION INSECURITY
IN THE PAST 12 MONTHS, HAS THE LACK OF TRANSPORTATION KEPT YOU FROM MEDICAL APPOINTMENTS OR FROM GETTING MEDICATIONS?: NO

## 2025-01-20 SDOH — ECONOMIC STABILITY: TRANSPORTATION INSECURITY
IN THE PAST 12 MONTHS, HAS LACK OF TRANSPORTATION KEPT YOU FROM MEETINGS, WORK, OR FROM GETTING THINGS NEEDED FOR DAILY LIVING?: NO

## 2025-01-20 ASSESSMENT — PATIENT HEALTH QUESTIONNAIRE - PHQ9
SUM OF ALL RESPONSES TO PHQ9 QUESTIONS 1 & 2: 0
1. LITTLE INTEREST OR PLEASURE IN DOING THINGS: NOT AT ALL
SUM OF ALL RESPONSES TO PHQ QUESTIONS 1-9: 0
1. LITTLE INTEREST OR PLEASURE IN DOING THINGS: NOT AT ALL
SUM OF ALL RESPONSES TO PHQ QUESTIONS 1-9: 0
2. FEELING DOWN, DEPRESSED OR HOPELESS: NOT AT ALL
SUM OF ALL RESPONSES TO PHQ QUESTIONS 1-9: 0
2. FEELING DOWN, DEPRESSED OR HOPELESS: NOT AT ALL
SUM OF ALL RESPONSES TO PHQ9 QUESTIONS 1 & 2: 0
SUM OF ALL RESPONSES TO PHQ QUESTIONS 1-9: 0

## 2025-01-22 ENCOUNTER — OFFICE VISIT (OUTPATIENT)
Dept: PRIMARY CARE CLINIC | Facility: CLINIC | Age: 70
End: 2025-01-22
Payer: MEDICARE

## 2025-01-22 VITALS
HEIGHT: 69 IN | BODY MASS INDEX: 22.33 KG/M2 | TEMPERATURE: 98.2 F | WEIGHT: 150.8 LBS | SYSTOLIC BLOOD PRESSURE: 135 MMHG | DIASTOLIC BLOOD PRESSURE: 71 MMHG | OXYGEN SATURATION: 96 % | HEART RATE: 69 BPM

## 2025-01-22 DIAGNOSIS — I25.83 CORONARY ARTERY DISEASE DUE TO LIPID RICH PLAQUE: ICD-10-CM

## 2025-01-22 DIAGNOSIS — R79.89 ELEVATED PLATELET COUNT: ICD-10-CM

## 2025-01-22 DIAGNOSIS — I10 PRIMARY HYPERTENSION: Primary | ICD-10-CM

## 2025-01-22 DIAGNOSIS — Z12.5 SCREENING FOR PROSTATE CANCER: ICD-10-CM

## 2025-01-22 DIAGNOSIS — I25.10 CORONARY ARTERY DISEASE DUE TO LIPID RICH PLAQUE: ICD-10-CM

## 2025-01-22 DIAGNOSIS — J44.9 CHRONIC OBSTRUCTIVE PULMONARY DISEASE, UNSPECIFIED COPD TYPE (HCC): ICD-10-CM

## 2025-01-22 DIAGNOSIS — R73.03 PREDIABETES: ICD-10-CM

## 2025-01-22 DIAGNOSIS — Z95.820 S/P ANGIOPLASTY WITH STENT: ICD-10-CM

## 2025-01-22 DIAGNOSIS — R06.02 SOB (SHORTNESS OF BREATH): ICD-10-CM

## 2025-01-22 DIAGNOSIS — E78.5 HYPERLIPIDEMIA, UNSPECIFIED HYPERLIPIDEMIA TYPE: ICD-10-CM

## 2025-01-22 DIAGNOSIS — Z71.3 DIETARY COUNSELING: ICD-10-CM

## 2025-01-22 PROCEDURE — 99214 OFFICE O/P EST MOD 30 MIN: CPT | Performed by: FAMILY MEDICINE

## 2025-01-22 PROCEDURE — 3075F SYST BP GE 130 - 139MM HG: CPT | Performed by: FAMILY MEDICINE

## 2025-01-22 PROCEDURE — 1123F ACP DISCUSS/DSCN MKR DOCD: CPT | Performed by: FAMILY MEDICINE

## 2025-01-22 PROCEDURE — 3078F DIAST BP <80 MM HG: CPT | Performed by: FAMILY MEDICINE

## 2025-01-22 RX ORDER — FLUTICASONE FUROATE, UMECLIDINIUM BROMIDE AND VILANTEROL TRIFENATATE 100; 62.5; 25 UG/1; UG/1; UG/1
1 POWDER RESPIRATORY (INHALATION) DAILY
Qty: 3 EACH | Refills: 1 | Status: SHIPPED | OUTPATIENT
Start: 2025-01-22

## 2025-01-22 RX ORDER — LISINOPRIL 5 MG/1
5 TABLET ORAL NIGHTLY
Qty: 90 TABLET | Refills: 1 | Status: SHIPPED | OUTPATIENT
Start: 2025-01-22

## 2025-01-22 RX ORDER — ROSUVASTATIN CALCIUM 20 MG/1
20 TABLET, COATED ORAL NIGHTLY
Qty: 90 TABLET | Refills: 1 | Status: SHIPPED | OUTPATIENT
Start: 2025-01-22

## 2025-01-22 RX ORDER — ALBUTEROL SULFATE 90 UG/1
2 INHALANT RESPIRATORY (INHALATION) 4 TIMES DAILY PRN
Qty: 1 EACH | Refills: 5 | Status: CANCELLED | OUTPATIENT
Start: 2025-01-22

## 2025-01-22 ASSESSMENT — ENCOUNTER SYMPTOMS
GASTROINTESTINAL NEGATIVE: 1
BACK PAIN: 1
ALLERGIC/IMMUNOLOGIC NEGATIVE: 1
EYES NEGATIVE: 1

## 2025-01-22 NOTE — PROGRESS NOTES
[facility]  [address]  [place] [address]  Office : [phone]  Fax : [phone]      Subjective:  The patient is a 69 y.o. male  who presents for f/u on multiple chronic medical conditions-good compliance with medications--pt here to get routeine labs and need refill on meds.no cardiopulmonary symptoms  CAD-pt scheduled for stenting in July 2024-continues to smoke tobacco-s/p stents doing well-stopped tobacco use in 2025  Hypertension-meed treatment  Hyperlipidemia-diet controlled  COPD=sob on and off few months--interested in inhalers-denies CP/palpitations--interested in cardiac work up  CXR =COPD  CT chest last years -no lung nodules-need recheck  Chronic lowback pain/cervicalgia/bilateral shoulder pain -arthritis-pt not interested in xray or ortho consult-willing to consider PT  Migraine headache-more on left side--worse and more frequently-seen eye MD-no acute cause found-MRI brain-no acute changes--no N/V/D/C/ABD PAIN/photosensitivity  Skin lesions-lower extremities-referred to  derm-family hx of skin cancer      Pt c.of bilateral lower extremity pain/cramps on and off-more on left side-claudication-better after walking--no fall/injury or local redness    Patient Active Problem List   Diagnosis    Ocular migraine    H/O seasonal allergies    BPPV (benign paroxysmal positional vertigo)    Attention deficit hyperactivity disorder (ADHD)    COPD exacerbation (HCC)    Tobacco abuse    COPD (chronic obstructive pulmonary disease) (HCC)    Chronic nonintractable headache    Skin lesions    Viral warts    Chronic pain of both shoulders    Cervicalgia    Chronic bilateral low back pain without sciatica    Prediabetes    Hyperlipidemia    Primary hypertension    SOB (shortness of breath)    Abnormal EKG    Abnormal nuclear stress test    Dietary counseling    Pain in both lower extremities    Claudication of lower extremity (HCC)    Coronary artery disease due to lipid rich plaque

## 2025-02-21 PROBLEM — Z12.5 SCREENING FOR PROSTATE CANCER: Status: RESOLVED | Noted: 2025-01-22 | Resolved: 2025-02-21

## 2025-03-07 DIAGNOSIS — I10 PRIMARY HYPERTENSION: ICD-10-CM

## 2025-03-07 DIAGNOSIS — E78.5 HYPERLIPIDEMIA, UNSPECIFIED HYPERLIPIDEMIA TYPE: ICD-10-CM

## 2025-03-07 DIAGNOSIS — I25.83 CORONARY ARTERY DISEASE DUE TO LIPID RICH PLAQUE: ICD-10-CM

## 2025-03-07 DIAGNOSIS — I25.10 CORONARY ARTERY DISEASE DUE TO LIPID RICH PLAQUE: ICD-10-CM

## 2025-03-07 DIAGNOSIS — J44.9 CHRONIC OBSTRUCTIVE PULMONARY DISEASE, UNSPECIFIED COPD TYPE (HCC): ICD-10-CM

## 2025-03-07 DIAGNOSIS — Z12.5 SCREENING FOR PROSTATE CANCER: ICD-10-CM

## 2025-03-07 LAB
ALBUMIN SERPL-MCNC: 4 G/DL (ref 3.2–4.6)
ALBUMIN/GLOB SERPL: 1.2 (ref 1–1.9)
ALP SERPL-CCNC: 63 U/L (ref 40–129)
ALT SERPL-CCNC: 60 U/L (ref 8–55)
ANION GAP SERPL CALC-SCNC: 10 MMOL/L (ref 7–16)
AST SERPL-CCNC: 38 U/L (ref 15–37)
BASOPHILS # BLD: 0.09 K/UL (ref 0–0.2)
BASOPHILS NFR BLD: 1.2 % (ref 0–2)
BILIRUB SERPL-MCNC: 0.4 MG/DL (ref 0–1.2)
BUN SERPL-MCNC: 21 MG/DL (ref 8–23)
CALCIUM SERPL-MCNC: 9.9 MG/DL (ref 8.8–10.2)
CHLORIDE SERPL-SCNC: 102 MMOL/L (ref 98–107)
CHOLEST SERPL-MCNC: 127 MG/DL (ref 0–200)
CO2 SERPL-SCNC: 27 MMOL/L (ref 20–29)
CREAT SERPL-MCNC: 0.98 MG/DL (ref 0.8–1.3)
DIFFERENTIAL METHOD BLD: ABNORMAL
EOSINOPHIL # BLD: 0.33 K/UL (ref 0–0.8)
EOSINOPHIL NFR BLD: 4.3 % (ref 0.5–7.8)
ERYTHROCYTE [DISTWIDTH] IN BLOOD BY AUTOMATED COUNT: 15.4 % (ref 11.9–14.6)
GLOBULIN SER CALC-MCNC: 3.4 G/DL (ref 2.3–3.5)
GLUCOSE SERPL-MCNC: 79 MG/DL (ref 70–99)
HCT VFR BLD AUTO: 44.2 % (ref 41.1–50.3)
HDLC SERPL-MCNC: 57 MG/DL (ref 40–60)
HDLC SERPL: 2.2 (ref 0–5)
HGB BLD-MCNC: 14.7 G/DL (ref 13.6–17.2)
IMM GRANULOCYTES # BLD AUTO: 0.02 K/UL (ref 0–0.5)
IMM GRANULOCYTES NFR BLD AUTO: 0.3 % (ref 0–5)
LDLC SERPL CALC-MCNC: 57 MG/DL (ref 0–100)
LYMPHOCYTES # BLD: 1.95 K/UL (ref 0.5–4.6)
LYMPHOCYTES NFR BLD: 25.5 % (ref 13–44)
MCH RBC QN AUTO: 29.3 PG (ref 26.1–32.9)
MCHC RBC AUTO-ENTMCNC: 33.3 G/DL (ref 31.4–35)
MCV RBC AUTO: 88.2 FL (ref 82–102)
MONOCYTES # BLD: 0.89 K/UL (ref 0.1–1.3)
MONOCYTES NFR BLD: 11.6 % (ref 4–12)
NEUTS SEG # BLD: 4.38 K/UL (ref 1.7–8.2)
NEUTS SEG NFR BLD: 57.1 % (ref 43–78)
NRBC # BLD: 0 K/UL (ref 0–0.2)
PLATELET # BLD AUTO: 801 K/UL (ref 150–450)
PMV BLD AUTO: 9.3 FL (ref 9.4–12.3)
POTASSIUM SERPL-SCNC: 4.9 MMOL/L (ref 3.5–5.1)
PROT SERPL-MCNC: 7.4 G/DL (ref 6.3–8.2)
PSA SERPL-MCNC: 1.4 NG/ML (ref 0–4)
RBC # BLD AUTO: 5.01 M/UL (ref 4.23–5.6)
SODIUM SERPL-SCNC: 139 MMOL/L (ref 136–145)
TRIGL SERPL-MCNC: 65 MG/DL (ref 0–150)
VLDLC SERPL CALC-MCNC: 13 MG/DL (ref 6–23)
WBC # BLD AUTO: 7.7 K/UL (ref 4.3–11.1)

## 2025-03-08 ENCOUNTER — RESULTS FOLLOW-UP (OUTPATIENT)
Dept: PRIMARY CARE CLINIC | Facility: CLINIC | Age: 70
End: 2025-03-08

## 2025-03-27 ENCOUNTER — TELEMEDICINE (OUTPATIENT)
Dept: PRIMARY CARE CLINIC | Facility: CLINIC | Age: 70
End: 2025-03-27
Payer: MEDICARE

## 2025-03-27 DIAGNOSIS — I25.83 CORONARY ARTERY DISEASE DUE TO LIPID RICH PLAQUE: ICD-10-CM

## 2025-03-27 DIAGNOSIS — Z95.820 S/P ANGIOPLASTY WITH STENT: ICD-10-CM

## 2025-03-27 DIAGNOSIS — R79.89 ELEVATED PLATELET COUNT: Primary | ICD-10-CM

## 2025-03-27 DIAGNOSIS — Z71.3 DIETARY COUNSELING: ICD-10-CM

## 2025-03-27 DIAGNOSIS — R74.8 ELEVATED LIVER ENZYMES: ICD-10-CM

## 2025-03-27 DIAGNOSIS — J44.9 CHRONIC OBSTRUCTIVE PULMONARY DISEASE, UNSPECIFIED COPD TYPE (HCC): ICD-10-CM

## 2025-03-27 DIAGNOSIS — E78.5 HYPERLIPIDEMIA, UNSPECIFIED HYPERLIPIDEMIA TYPE: ICD-10-CM

## 2025-03-27 DIAGNOSIS — R73.03 PREDIABETES: ICD-10-CM

## 2025-03-27 DIAGNOSIS — I25.10 CORONARY ARTERY DISEASE DUE TO LIPID RICH PLAQUE: ICD-10-CM

## 2025-03-27 DIAGNOSIS — I10 PRIMARY HYPERTENSION: ICD-10-CM

## 2025-03-27 PROCEDURE — 1159F MED LIST DOCD IN RCRD: CPT | Performed by: FAMILY MEDICINE

## 2025-03-27 PROCEDURE — 99214 OFFICE O/P EST MOD 30 MIN: CPT | Performed by: FAMILY MEDICINE

## 2025-03-27 PROCEDURE — 1123F ACP DISCUSS/DSCN MKR DOCD: CPT | Performed by: FAMILY MEDICINE

## 2025-03-27 RX ORDER — FLUTICASONE FUROATE, UMECLIDINIUM BROMIDE AND VILANTEROL TRIFENATATE 100; 62.5; 25 UG/1; UG/1; UG/1
1 POWDER RESPIRATORY (INHALATION) DAILY
Qty: 3 EACH | Refills: 1 | Status: SHIPPED | OUTPATIENT
Start: 2025-03-27

## 2025-03-27 ASSESSMENT — ENCOUNTER SYMPTOMS
EYES NEGATIVE: 1
GASTROINTESTINAL NEGATIVE: 1
ALLERGIC/IMMUNOLOGIC NEGATIVE: 1
BACK PAIN: 1

## 2025-03-27 NOTE — PROGRESS NOTES
ibuprofen (ADVIL;MOTRIN) 200 MG CAPS capsule Take 2 capsules by mouth every 8 hours as needed for Pain 30 capsule 1    albuterol sulfate HFA (VENTOLIN HFA) 108 (90 Base) MCG/ACT inhaler Inhale 2 puffs into the lungs 4 times daily as needed for Wheezing (Patient not taking: Reported on 3/27/2025) 1 each 5     No current facility-administered medications for this visit.         Review of Systems   Constitutional: Negative.    HENT: Negative.     Eyes: Negative.    Gastrointestinal: Negative.    Endocrine: Negative.    Genitourinary: Negative.    Musculoskeletal:  Positive for arthralgias and back pain.   Skin: Negative.    Allergic/Immunologic: Negative.    Hematological: Negative.    Psychiatric/Behavioral: Negative.            Objective:    There were no vitals taken for this visit.    Physical Exam  HENT:      Head: Normocephalic and atraumatic.      Right Ear: External ear normal.      Left Ear: External ear normal.      Nose: Nose normal.      Mouth/Throat:      Mouth: Mucous membranes are moist.      Pharynx: Oropharynx is clear.   Eyes:      Conjunctiva/sclera: Conjunctivae normal.   Pulmonary:      Effort: Pulmonary effort is normal.   Musculoskeletal:      Comments: C spine/LS spine/bilateral shoulder  Diffuse joint and paraspinal area tenderness bilateral  Painful rom   Skin:     General: Skin is warm.   Neurological:      General: No focal deficit present.      Mental Status: He is alert and oriented to person, place, and time.   Psychiatric:         Mood and Affect: Mood normal.         Behavior: Behavior normal.         Thought Content: Thought content normal.         Judgment: Judgment normal.            ASSESSMENT/PLAN:    1. Elevated platelet count  Overview:  Recheck-still high--will refer to hematologist  Orders:  -     [facility] On[facility]  2. Elevated liver enzymes  Comments:  fatty liver vs crestor  avoid alohol and fried foods  f/u in few months for recheck  Orders:  -

## 2025-03-31 NOTE — TELEPHONE ENCOUNTER
----- Message from Helena Ramirez MD sent at 2/14/2024  7:23 PM EST -----  MRI brain negative for anything acute  Pt has changes related to BP in brain-nothing major  
Patient was notified; voiced understanding.  Patient has not receive a call for the Colonoscopy.  I do not see the order.  Can you please send referral.    Thank you  
abnormal

## 2025-04-03 NOTE — PROGRESS NOTES
NEW PATIENT ABSTRACT      Referral Diagnosis: elevated platelet count    Referring Provider: Helena Ramirez MD    Primary Care Provider: Helena Ramirez MD    Presenting Symptoms: elevated platelets    Family History of Cancer: Cancer-related family history is not on file.    Past Medical History:   Past Medical History:   Diagnosis Date    COPD (chronic obstructive pulmonary disease) (HCC)        Chronological History of Pertinent Events (From Onset of Presenting Symptoms): Pt is a 68 yo male.   -3/27/25: Presented to PCP to review recent abnormal lab results.       Record located in Epic.      Pertinent Notes from Referring Provider: Elevated liver enzymes could be d/t fatty liver or crestor. Pt does drink alcohol.    Other Pertinent Information: none

## 2025-04-17 DIAGNOSIS — R79.89 ELEVATED PLATELET COUNT: Primary | ICD-10-CM

## 2025-04-23 ENCOUNTER — HOSPITAL ENCOUNTER (OUTPATIENT)
Dept: LAB | Age: 70
Discharge: HOME | End: 2025-04-23
Payer: MEDICARE

## 2025-04-23 ENCOUNTER — OFFICE VISIT (OUTPATIENT)
Dept: ONCOLOGY | Age: 70
End: 2025-04-23
Payer: MEDICARE

## 2025-04-23 VITALS
HEART RATE: 40 BPM | BODY MASS INDEX: 22.29 KG/M2 | HEIGHT: 69 IN | OXYGEN SATURATION: 97 % | TEMPERATURE: 97.6 F | SYSTOLIC BLOOD PRESSURE: 132 MMHG | RESPIRATION RATE: 18 BRPM | DIASTOLIC BLOOD PRESSURE: 65 MMHG | WEIGHT: 150.5 LBS

## 2025-04-23 DIAGNOSIS — D75.839 THROMBOCYTOSIS: ICD-10-CM

## 2025-04-23 DIAGNOSIS — D47.1 MYELOPROLIFERATIVE DISORDER (HCC): ICD-10-CM

## 2025-04-23 DIAGNOSIS — D75.839 THROMBOCYTOSIS: Primary | ICD-10-CM

## 2025-04-23 DIAGNOSIS — R79.89 ELEVATED PLATELET COUNT: ICD-10-CM

## 2025-04-23 LAB
ALBUMIN SERPL-MCNC: 3.8 G/DL (ref 3.2–4.6)
ALBUMIN/GLOB SERPL: 1 (ref 1–1.9)
ALP SERPL-CCNC: 66 U/L (ref 40–129)
ALT SERPL-CCNC: 39 U/L (ref 8–55)
ANION GAP SERPL CALC-SCNC: 11 MMOL/L (ref 7–16)
AST SERPL-CCNC: 29 U/L (ref 15–37)
BASOPHILS # BLD: 0.08 K/UL (ref 0–0.2)
BASOPHILS NFR BLD: 1 % (ref 0–2)
BILIRUB SERPL-MCNC: 0.5 MG/DL (ref 0–1.2)
BUN SERPL-MCNC: 19 MG/DL (ref 8–23)
CALCIUM SERPL-MCNC: 9.4 MG/DL (ref 8.8–10.2)
CHLORIDE SERPL-SCNC: 102 MMOL/L (ref 98–107)
CO2 SERPL-SCNC: 25 MMOL/L (ref 20–29)
CREAT SERPL-MCNC: 0.99 MG/DL (ref 0.8–1.3)
CRP SERPL HS-MCNC: 3 MG/L (ref 0–3)
DIFFERENTIAL METHOD BLD: ABNORMAL
EOSINOPHIL # BLD: 0.29 K/UL (ref 0–0.8)
EOSINOPHIL NFR BLD: 3.7 % (ref 0.5–7.8)
ERYTHROCYTE [DISTWIDTH] IN BLOOD BY AUTOMATED COUNT: 14.5 % (ref 11.9–14.6)
ERYTHROCYTE [SEDIMENTATION RATE] IN BLOOD: 3 MM/HR (ref 0–20)
FERRITIN SERPL-MCNC: 29 NG/ML (ref 8–388)
GLOBULIN SER CALC-MCNC: 3.7 G/DL (ref 2.3–3.5)
GLUCOSE SERPL-MCNC: 126 MG/DL (ref 70–99)
HCT VFR BLD AUTO: 44.7 % (ref 41.1–50.3)
HGB BLD-MCNC: 14.8 G/DL (ref 13.6–17.2)
IMM GRANULOCYTES # BLD AUTO: 0.03 K/UL (ref 0–0.5)
IMM GRANULOCYTES NFR BLD AUTO: 0.4 % (ref 0–5)
IRON SATN MFR SERPL: 32 % (ref 20–50)
IRON SERPL-MCNC: 92 UG/DL (ref 35–100)
LYMPHOCYTES # BLD: 1.13 K/UL (ref 0.5–4.6)
LYMPHOCYTES NFR BLD: 14.2 % (ref 13–44)
MCH RBC QN AUTO: 29.3 PG (ref 26.1–32.9)
MCHC RBC AUTO-ENTMCNC: 33.1 G/DL (ref 31.4–35)
MCV RBC AUTO: 88.5 FL (ref 82–102)
MONOCYTES # BLD: 0.7 K/UL (ref 0.1–1.3)
MONOCYTES NFR BLD: 8.8 % (ref 4–12)
NEUTS SEG # BLD: 5.7 K/UL (ref 1.7–8.2)
NEUTS SEG NFR BLD: 71.9 % (ref 43–78)
NRBC # BLD: 0 K/UL (ref 0–0.2)
PLATELET # BLD AUTO: 634 K/UL (ref 150–450)
PMV BLD AUTO: 9.2 FL (ref 9.4–12.3)
POTASSIUM SERPL-SCNC: 4.3 MMOL/L (ref 3.5–5.1)
PROT SERPL-MCNC: 7.5 G/DL (ref 6.3–8.2)
RBC # BLD AUTO: 5.05 M/UL (ref 4.23–5.6)
SODIUM SERPL-SCNC: 138 MMOL/L (ref 136–145)
TIBC SERPL-MCNC: 288 UG/DL (ref 240–450)
UIBC SERPL-MCNC: 196 UG/DL (ref 112–347)
WBC # BLD AUTO: 7.9 K/UL (ref 4.3–11.1)

## 2025-04-23 PROCEDURE — 85652 RBC SED RATE AUTOMATED: CPT

## 2025-04-23 PROCEDURE — 86141 C-REACTIVE PROTEIN HS: CPT

## 2025-04-23 PROCEDURE — 36415 COLL VENOUS BLD VENIPUNCTURE: CPT

## 2025-04-23 PROCEDURE — 85025 COMPLETE CBC W/AUTO DIFF WBC: CPT

## 2025-04-23 PROCEDURE — 1126F AMNT PAIN NOTED NONE PRSNT: CPT | Performed by: INTERNAL MEDICINE

## 2025-04-23 PROCEDURE — 83550 IRON BINDING TEST: CPT

## 2025-04-23 PROCEDURE — 3017F COLORECTAL CA SCREEN DOC REV: CPT | Performed by: INTERNAL MEDICINE

## 2025-04-23 PROCEDURE — G8420 CALC BMI NORM PARAMETERS: HCPCS | Performed by: INTERNAL MEDICINE

## 2025-04-23 PROCEDURE — 3075F SYST BP GE 130 - 139MM HG: CPT | Performed by: INTERNAL MEDICINE

## 2025-04-23 PROCEDURE — G8427 DOCREV CUR MEDS BY ELIG CLIN: HCPCS | Performed by: INTERNAL MEDICINE

## 2025-04-23 PROCEDURE — 1160F RVW MEDS BY RX/DR IN RCRD: CPT | Performed by: INTERNAL MEDICINE

## 2025-04-23 PROCEDURE — 4004F PT TOBACCO SCREEN RCVD TLK: CPT | Performed by: INTERNAL MEDICINE

## 2025-04-23 PROCEDURE — 1159F MED LIST DOCD IN RCRD: CPT | Performed by: INTERNAL MEDICINE

## 2025-04-23 PROCEDURE — 3078F DIAST BP <80 MM HG: CPT | Performed by: INTERNAL MEDICINE

## 2025-04-23 PROCEDURE — 99204 OFFICE O/P NEW MOD 45 MIN: CPT | Performed by: INTERNAL MEDICINE

## 2025-04-23 PROCEDURE — 83540 ASSAY OF IRON: CPT

## 2025-04-23 PROCEDURE — 82728 ASSAY OF FERRITIN: CPT

## 2025-04-23 PROCEDURE — 80053 COMPREHEN METABOLIC PANEL: CPT

## 2025-04-23 PROCEDURE — 1123F ACP DISCUSS/DSCN MKR DOCD: CPT | Performed by: INTERNAL MEDICINE

## 2025-04-23 ASSESSMENT — PATIENT HEALTH QUESTIONNAIRE - PHQ9
SUM OF ALL RESPONSES TO PHQ QUESTIONS 1-9: 0
SUM OF ALL RESPONSES TO PHQ QUESTIONS 1-9: 0
1. LITTLE INTEREST OR PLEASURE IN DOING THINGS: NOT AT ALL
2. FEELING DOWN, DEPRESSED OR HOPELESS: NOT AT ALL
SUM OF ALL RESPONSES TO PHQ QUESTIONS 1-9: 0
SUM OF ALL RESPONSES TO PHQ QUESTIONS 1-9: 0

## 2025-04-23 NOTE — PROGRESS NOTES
[facility] Hematology & Oncology: Office Visit New Patient H/P    Chief Complaint:    Thrombocytosis    History of Present Illness:  Referral Diagnosis: elevated platelet count     Referring Provider: Helena Ramirez MD     Primary Care Provider: Helena Ramirez MD     Presenting Symptoms: elevated platelets     Family History of Cancer: Cancer-related family history is not on file.     Past Medical History:   Past Medical History        Past Medical History:   Diagnosis Date    COPD (chronic obstructive pulmonary disease) (HCC)              Chronological History of Pertinent Events (From Onset of Presenting Symptoms): Pt is a 68 yo male.   -3/27/25: Presented to PCP to review recent abnormal lab results.       Record located in Epic.       Pertinent Notes from Referring Provider: Elevated liver enzymes could be d/t fatty liver or crestor. Pt does drink alcohol.    History of Present Illness  The patient is a 69 y.o. male who presents for thrombocytosis with a platelet count of 634, which has been fluctuating around this level for at least the past year. A search of labs through [facility] showed a platelet count of 608 as early as 2023. He is accompanied by his cardiologist, Dr. Blackwell, and reports frequent nosebleeds.    Overall good health is reported, although there is concern about his heart rate, which was recorded at 40 on two separate occasions. His typical heart rate ranges between 65 and 70. He is not currently on any beta-blockers and takes Plavix in the morning. He has a history of stent placement and has undergone EKG testing, with the last EKG in July 2024 showing a heart rate of 46.    Frequent nosebleeds are managed by tilting his head back. There is curiosity about the potential impact of high platelet count on his stents and whether it could lead to clotting. He also questions if his low heart rate could be related to elevated platelet count. Speculation about a diet deficient in iron is

## 2025-04-23 NOTE — PATIENT INSTRUCTIONS
Patient Information from Today's Visit    The members of your [facility] are listed below:    Physician Provider: Dr. Ponce Cerda  Advanced Practice Clinician: Ghazal Malagon  Registered Nurse: MARCIA  Nurse Navigator: N/A  Medical Assistant: Rufus MÉNDEZ   : Tyra \"Cammie\" TA.   Supportive Care Services: [facility]    Diagnosis (Information Sheet Provided on Day of Diagnosis): New Patient --elevated platelet count     Follow Up Instructions:     -Past medical history and labs reviewed.   -Continue taking your medications as prescribed.   -We recommend that you get scheduled for a follow up with cardiology.   -We will recheck your labs and follow up in 4 weeks.    Has Treatment Plan Been Finalized? N/A     Current Labs:   Hospital Outpatient Visit on 04/23/2025   Component Date Value Ref Range Status    WBC 04/23/2025 7.9  4.3 - 11.1 K/uL Final    RBC 04/23/2025 5.05  4.23 - 5.6 M/uL Final    Hemoglobin 04/23/2025 14.8  13.6 - 17.2 g/dL Final    Hematocrit 04/23/2025 44.7  41.1 - 50.3 % Final    MCV 04/23/2025 88.5  82.0 - 102.0 FL Final    MCH 04/23/2025 29.3  26.1 - 32.9 PG Final    MCHC 04/23/2025 33.1  31.4 - 35.0 g/dL Final    RDW 04/23/2025 14.5  11.9 - 14.6 % Final    Platelets 04/23/2025 634 (H)  150 - 450 K/uL Final    MPV 04/23/2025 9.2 (L)  9.4 - 12.3 FL Final    nRBC 04/23/2025 0.00  0.0 - 0.2 K/uL Final    **Note: Absolute NRBC parameter is now reported with Hemogram**    Neutrophils % 04/23/2025 71.9  43.0 - 78.0 % Final    Lymphocytes % 04/23/2025 14.2  13.0 - 44.0 % Final    Monocytes % 04/23/2025 8.8  4.0 - 12.0 % Final    Eosinophils % 04/23/2025 3.7  0.5 - 7.8 % Final    Basophils % 04/23/2025 1.0  0.0 - 2.0 % Final    Immature Granulocytes % 04/23/2025 0.4  0.0 - 5.0 % Final    Neutrophils Absolute 04/23/2025 5.70  1.70 - 8.20 K/UL Final    Lymphocytes Absolute 04/23/2025 1.13  0.50 - 4.60 K/UL Final    Monocytes Absolute 04/23/2025 0.70  0.10 - 1.30 K/UL Final

## 2025-04-25 LAB
COLLECTION INFORMATION: NORMAL
DATE SENT TO REF LAB: NORMAL
Lab: NORMAL

## 2025-05-21 ENCOUNTER — OFFICE VISIT (OUTPATIENT)
Dept: ONCOLOGY | Age: 70
End: 2025-05-21
Payer: MEDICARE

## 2025-05-21 ENCOUNTER — HOSPITAL ENCOUNTER (OUTPATIENT)
Dept: LAB | Age: 70
Discharge: HOME | End: 2025-05-21
Payer: MEDICARE

## 2025-05-21 VITALS
OXYGEN SATURATION: 98 % | WEIGHT: 151.7 LBS | SYSTOLIC BLOOD PRESSURE: 142 MMHG | HEIGHT: 69 IN | TEMPERATURE: 97.3 F | RESPIRATION RATE: 18 BRPM | DIASTOLIC BLOOD PRESSURE: 68 MMHG | BODY MASS INDEX: 22.47 KG/M2 | HEART RATE: 45 BPM

## 2025-05-21 DIAGNOSIS — D75.839 THROMBOCYTOSIS: ICD-10-CM

## 2025-05-21 DIAGNOSIS — Z15.89 JAK2 V617F MUTATION: ICD-10-CM

## 2025-05-21 DIAGNOSIS — D47.3 ESSENTIAL THROMBOCYTHEMIA (HCC): Primary | ICD-10-CM

## 2025-05-21 LAB
ALBUMIN SERPL-MCNC: 3.8 G/DL (ref 3.2–4.6)
ALBUMIN/GLOB SERPL: 1 (ref 1–1.9)
ALP SERPL-CCNC: 65 U/L (ref 40–129)
ALT SERPL-CCNC: 35 U/L (ref 8–55)
ANION GAP SERPL CALC-SCNC: 11 MMOL/L (ref 7–16)
AST SERPL-CCNC: 31 U/L (ref 15–37)
BASOPHILS # BLD: 0.08 K/UL (ref 0–0.2)
BASOPHILS NFR BLD: 1.1 % (ref 0–2)
BILIRUB SERPL-MCNC: 0.6 MG/DL (ref 0–1.2)
BUN SERPL-MCNC: 19 MG/DL (ref 8–23)
CALCIUM SERPL-MCNC: 9.2 MG/DL (ref 8.8–10.2)
CHLORIDE SERPL-SCNC: 100 MMOL/L (ref 98–107)
CO2 SERPL-SCNC: 25 MMOL/L (ref 20–29)
CREAT SERPL-MCNC: 0.92 MG/DL (ref 0.8–1.3)
CRP SERPL-MCNC: <0.3 MG/DL (ref 0–0.4)
DIFFERENTIAL METHOD BLD: ABNORMAL
EOSINOPHIL # BLD: 0.29 K/UL (ref 0–0.8)
EOSINOPHIL NFR BLD: 4.2 % (ref 0.5–7.8)
ERYTHROCYTE [DISTWIDTH] IN BLOOD BY AUTOMATED COUNT: 14 % (ref 11.9–14.6)
FERRITIN SERPL-MCNC: 33 NG/ML (ref 8–388)
GLOBULIN SER CALC-MCNC: 3.7 G/DL (ref 2.3–3.5)
GLUCOSE SERPL-MCNC: 95 MG/DL (ref 70–99)
HCT VFR BLD AUTO: 42.3 % (ref 41.1–50.3)
HGB BLD-MCNC: 14.3 G/DL (ref 13.6–17.2)
IMM GRANULOCYTES # BLD AUTO: 0.02 K/UL (ref 0–0.5)
IMM GRANULOCYTES NFR BLD AUTO: 0.3 % (ref 0–5)
IRON SATN MFR SERPL: 48 % (ref 20–50)
IRON SERPL-MCNC: 143 UG/DL (ref 35–100)
LYMPHOCYTES # BLD: 2.05 K/UL (ref 0.5–4.6)
LYMPHOCYTES NFR BLD: 29.4 % (ref 13–44)
MCH RBC QN AUTO: 29.5 PG (ref 26.1–32.9)
MCHC RBC AUTO-ENTMCNC: 33.8 G/DL (ref 31.4–35)
MCV RBC AUTO: 87.2 FL (ref 82–102)
MONOCYTES # BLD: 0.78 K/UL (ref 0.1–1.3)
MONOCYTES NFR BLD: 11.2 % (ref 4–12)
NEUTS SEG # BLD: 3.75 K/UL (ref 1.7–8.2)
NEUTS SEG NFR BLD: 53.8 % (ref 43–78)
NRBC # BLD: 0 K/UL (ref 0–0.2)
PLATELET # BLD AUTO: 665 K/UL (ref 150–450)
PMV BLD AUTO: 9.4 FL (ref 9.4–12.3)
POTASSIUM SERPL-SCNC: 3.9 MMOL/L (ref 3.5–5.1)
PROT SERPL-MCNC: 7.4 G/DL (ref 6.3–8.2)
RBC # BLD AUTO: 4.85 M/UL (ref 4.23–5.6)
SODIUM SERPL-SCNC: 136 MMOL/L (ref 136–145)
TIBC SERPL-MCNC: 297 UG/DL (ref 240–450)
UIBC SERPL-MCNC: 154 UG/DL (ref 112–347)
WBC # BLD AUTO: 7 K/UL (ref 4.3–11.1)

## 2025-05-21 PROCEDURE — 36415 COLL VENOUS BLD VENIPUNCTURE: CPT

## 2025-05-21 PROCEDURE — 1126F AMNT PAIN NOTED NONE PRSNT: CPT | Performed by: INTERNAL MEDICINE

## 2025-05-21 PROCEDURE — 80053 COMPREHEN METABOLIC PANEL: CPT

## 2025-05-21 PROCEDURE — 85025 COMPLETE CBC W/AUTO DIFF WBC: CPT

## 2025-05-21 PROCEDURE — 83540 ASSAY OF IRON: CPT

## 2025-05-21 PROCEDURE — 82728 ASSAY OF FERRITIN: CPT

## 2025-05-21 PROCEDURE — 1123F ACP DISCUSS/DSCN MKR DOCD: CPT | Performed by: INTERNAL MEDICINE

## 2025-05-21 PROCEDURE — 99214 OFFICE O/P EST MOD 30 MIN: CPT | Performed by: INTERNAL MEDICINE

## 2025-05-21 PROCEDURE — G8420 CALC BMI NORM PARAMETERS: HCPCS | Performed by: INTERNAL MEDICINE

## 2025-05-21 PROCEDURE — 86140 C-REACTIVE PROTEIN: CPT

## 2025-05-21 PROCEDURE — 1160F RVW MEDS BY RX/DR IN RCRD: CPT | Performed by: INTERNAL MEDICINE

## 2025-05-21 PROCEDURE — 4004F PT TOBACCO SCREEN RCVD TLK: CPT | Performed by: INTERNAL MEDICINE

## 2025-05-21 PROCEDURE — G8427 DOCREV CUR MEDS BY ELIG CLIN: HCPCS | Performed by: INTERNAL MEDICINE

## 2025-05-21 PROCEDURE — 1159F MED LIST DOCD IN RCRD: CPT | Performed by: INTERNAL MEDICINE

## 2025-05-21 PROCEDURE — 3078F DIAST BP <80 MM HG: CPT | Performed by: INTERNAL MEDICINE

## 2025-05-21 PROCEDURE — 3017F COLORECTAL CA SCREEN DOC REV: CPT | Performed by: INTERNAL MEDICINE

## 2025-05-21 PROCEDURE — 3077F SYST BP >= 140 MM HG: CPT | Performed by: INTERNAL MEDICINE

## 2025-05-21 PROCEDURE — 83550 IRON BINDING TEST: CPT

## 2025-05-21 RX ORDER — HYDROXYUREA 500 MG/1
500 CAPSULE ORAL EVERY OTHER DAY
Qty: 45 CAPSULE | Refills: 1 | Status: SHIPPED | OUTPATIENT
Start: 2025-05-21

## 2025-05-21 NOTE — PROGRESS NOTES
[facility] Hematology & Oncology: Office Visit Progress Note    Chief Complaint:    Thrombocytosis    History of Present Illness:  Referral Diagnosis: elevated platelet count     Referring Provider: Helena Ramirez MD     Primary Care Provider: Helena Ramirez MD     Presenting Symptoms: elevated platelets     Family History of Cancer: Cancer-related family history is not on file.     Past Medical History:   Past Medical History        Past Medical History:   Diagnosis Date    COPD (chronic obstructive pulmonary disease) (HCC)              Chronological History of Pertinent Events (From Onset of Presenting Symptoms): Pt is a 70 yo male.   -3/27/25: Presented to PCP to review recent abnormal lab results.       Record located in Epic.       Pertinent Notes from Referring Provider: Elevated liver enzymes could be d/t fatty liver or crestor. Pt does drink alcohol.    History of Present Illness  The patient is a 69 y.o. male who presents for thrombocytosis with a platelet count of 634, which has been fluctuating around this level for at least the past year. A search of labs through [facility] showed a platelet count of 608 as early as 2023. He is accompanied by his cardiologist, Dr. Blackwell, and reports frequent nosebleeds.    Overall good health is reported, although there is concern about his heart rate, which was recorded at 40 on two separate occasions. His typical heart rate ranges between 65 and 70. He is not currently on any beta-blockers and takes Plavix in the morning. He has a history of stent placement and has undergone EKG testing, with the last EKG in July 2024 showing a heart rate of 46.    Frequent nosebleeds are managed by tilting his head back. There is curiosity about the potential impact of high platelet count on his stents and whether it could lead to clotting. He also questions if his low heart rate could be related to elevated platelet count. Speculation about a diet deficient in iron is

## 2025-05-21 NOTE — PATIENT INSTRUCTIONS
Immature Granulocytes % 05/21/2025 0.3  0.0 - 5.0 % Final    Neutrophils Absolute 05/21/2025 3.75  1.70 - 8.20 K/UL Final    Lymphocytes Absolute 05/21/2025 2.05  0.50 - 4.60 K/UL Final    Monocytes Absolute 05/21/2025 0.78  0.10 - 1.30 K/UL Final    Eosinophils Absolute 05/21/2025 0.29  0.00 - 0.80 K/UL Final    Basophils Absolute 05/21/2025 0.08  0.00 - 0.20 K/UL Final    Immature Granulocytes Absolute 05/21/2025 0.02  0.00 - 0.50 K/UL Final    Differential Type 05/21/2025 AUTOMATED    Final    Sodium 05/21/2025 136  136 - 145 mmol/L Final    Potassium 05/21/2025 3.9  3.5 - 5.1 mmol/L Final    Chloride 05/21/2025 100  98 - 107 mmol/L Final    CO2 05/21/2025 25  20 - 29 mmol/L Final    Anion Gap 05/21/2025 11  7 - 16 mmol/L Final    Glucose 05/21/2025 95  70 - 99 mg/dL Final    Comment: <70 mg/dL Consistent with, but not fully diagnostic of hypoglycemia.  100 - 125 mg/dL Impaired fasting glucose/consistent with pre-diabetes mellitus.  > 126 mg/dl Fasting glucose consistent with overt diabetes mellitus      BUN 05/21/2025 19  8 - 23 MG/DL Final    Creatinine 05/21/2025 0.92  0.80 - 1.30 MG/DL Final    Est, Glom Filt Rate 05/21/2025 >90  >60 ml/min/1.73m2 Final    Comment:    Pediatric calculator link: https://www.kidney.org/professionals/kdoqi/gfr_calculatorped     These results are not intended for use in patients <18 years of age.     eGFR results are calculated without a race factor using  the 2021 CKD-EPI equation. Careful clinical correlation is recommended, particularly when comparing to results calculated using previous equations.  The CKD-EPI equation is less accurate in patients with extremes of muscle mass, extra-renal metabolism of creatinine, excessive creatine ingestion, or following therapy that affects renal tubular secretion.      Calcium 05/21/2025 9.2  8.8 - 10.2 MG/DL Final    Total Bilirubin 05/21/2025 0.6  0.0 - 1.2 MG/DL Final    ALT 05/21/2025 35  8 - 55 U/L Final    AST 05/21/2025 31  15 - 37

## 2025-06-06 ENCOUNTER — OFFICE VISIT (OUTPATIENT)
Age: 70
End: 2025-06-06
Payer: MEDICARE

## 2025-06-06 VITALS
SYSTOLIC BLOOD PRESSURE: 120 MMHG | DIASTOLIC BLOOD PRESSURE: 52 MMHG | BODY MASS INDEX: 22.4 KG/M2 | HEIGHT: 69 IN | HEART RATE: 68 BPM

## 2025-06-06 DIAGNOSIS — E78.2 MIXED HYPERLIPIDEMIA: ICD-10-CM

## 2025-06-06 DIAGNOSIS — R06.09 DYSPNEA ON EXERTION: ICD-10-CM

## 2025-06-06 DIAGNOSIS — Z72.0 TOBACCO ABUSE: ICD-10-CM

## 2025-06-06 DIAGNOSIS — I10 PRIMARY HYPERTENSION: ICD-10-CM

## 2025-06-06 DIAGNOSIS — I25.10 CORONARY ARTERY DISEASE INVOLVING NATIVE CORONARY ARTERY OF NATIVE HEART WITHOUT ANGINA PECTORIS: Primary | ICD-10-CM

## 2025-06-06 PROCEDURE — G8420 CALC BMI NORM PARAMETERS: HCPCS | Performed by: INTERNAL MEDICINE

## 2025-06-06 PROCEDURE — 4004F PT TOBACCO SCREEN RCVD TLK: CPT | Performed by: INTERNAL MEDICINE

## 2025-06-06 PROCEDURE — 3017F COLORECTAL CA SCREEN DOC REV: CPT | Performed by: INTERNAL MEDICINE

## 2025-06-06 PROCEDURE — 99214 OFFICE O/P EST MOD 30 MIN: CPT | Performed by: INTERNAL MEDICINE

## 2025-06-06 PROCEDURE — 1123F ACP DISCUSS/DSCN MKR DOCD: CPT | Performed by: INTERNAL MEDICINE

## 2025-06-06 PROCEDURE — 1159F MED LIST DOCD IN RCRD: CPT | Performed by: INTERNAL MEDICINE

## 2025-06-06 PROCEDURE — 3078F DIAST BP <80 MM HG: CPT | Performed by: INTERNAL MEDICINE

## 2025-06-06 PROCEDURE — 1160F RVW MEDS BY RX/DR IN RCRD: CPT | Performed by: INTERNAL MEDICINE

## 2025-06-06 PROCEDURE — 3074F SYST BP LT 130 MM HG: CPT | Performed by: INTERNAL MEDICINE

## 2025-06-06 PROCEDURE — G8427 DOCREV CUR MEDS BY ELIG CLIN: HCPCS | Performed by: INTERNAL MEDICINE

## 2025-06-06 PROCEDURE — 1126F AMNT PAIN NOTED NONE PRSNT: CPT | Performed by: INTERNAL MEDICINE

## 2025-06-06 ASSESSMENT — ENCOUNTER SYMPTOMS
EYE REDNESS: 0
STRIDOR: 0
HEMATOCHEZIA: 0
HEMOPTYSIS: 0
ABDOMINAL PAIN: 0
HOARSE VOICE: 0
HEMATEMESIS: 0
DOUBLE VISION: 0
WHEEZING: 0

## 2025-06-06 NOTE — PROGRESS NOTES
reviewed with the patient today.        Lab Results   Component Value Date/Time    CHOL 127 03/07/2025 09:12 AM    HDL 57 03/07/2025 09:12 AM    LDL 57 03/07/2025 09:12 AM    .4 01/09/2024 02:17 PM    VLDL 13 03/07/2025 09:12 AM   ,hemoglobin, basic metabolic panel,   Lab Results   Component Value Date/Time    TSH 1.100 01/09/2024 02:17 PM    ,  Lab Results   Component Value Date/Time     05/21/2025 08:59 AM    K 3.9 05/21/2025 08:59 AM     05/21/2025 08:59 AM    CO2 25 05/21/2025 08:59 AM    BUN 19 05/21/2025 08:59 AM    GLOB 3.7 05/21/2025 08:59 AM    ALT 35 05/21/2025 08:59 AM    AST 31 05/21/2025 08:59 AM      No results found for: \"LDLDIRECT\"   Lab Results   Component Value Date    CREATININE 0.92 05/21/2025      Lab Results   Component Value Date    HGB 14.3 05/21/2025      Lab Results   Component Value Date     (H) 05/21/2025        -EKG from 4/9/2024-shows sinus rhythm with a heart rate of 67 bpm, nonspecific IVCD, LVH, anterolateral ST-T wave changes that could be consistent with ischemia versus strain pattern-personally reviewed with him    ASSESSMENT and PLAN      Coronary artery disease-native artery-native heart without angina.  Status post 2 drug-eluting stents placed to the mid left circumflex coronary artery July 2024-remains on dual antiplatelet therapy with high intensity statin therapy.    Primary hypertension  -Blood pressure is well-controlled with lisinopril-continue for now.      Dyspnea on exertion/chronic obstructive pulmonary disease, unspecified COPD type (HCC)  Counseled on importance of quitting smoking completely.  Continue inhaler    Tobacco abuse  Currently smoking less than 5 cigarettes a day-understands need to quit completely.  Working on cutting this out gradually.    Mixed hyperlipidemia  Continue rosuvastatin 20 mg daily-lipids are significantly better at this point.  At goal-continue current therapy    Cardiomyopathy-ischemic  -Not on beta-blocker

## 2025-08-19 ENCOUNTER — PATIENT MESSAGE (OUTPATIENT)
Dept: PRIMARY CARE CLINIC | Facility: CLINIC | Age: 70
End: 2025-08-19

## (undated) DEVICE — CANNULA NSL ORAL AD FOR CAPNOFLEX CO2 O2 AIRLFE

## (undated) DEVICE — NEEDLE SYR 18GA L1.5IN RED PLAS HUB S STL BLNT FILL W/O

## (undated) DEVICE — YANKAUER,BULB TIP,W/O VENT,RIGID,STERILE: Brand: MEDLINE

## (undated) DEVICE — KENDALL RADIOLUCENT FOAM MONITORING ELECTRODE RECTANGULAR SHAPE: Brand: KENDALL

## (undated) DEVICE — SYRINGE, LUER SLIP, STERILE, 60ML: Brand: MEDLINE

## (undated) DEVICE — GLIDESHEATH SLENDER STAINLESS STEEL KIT: Brand: GLIDESHEATH SLENDER

## (undated) DEVICE — SYRINGE MED 10ML LUERLOCK TIP W/O SFTY DISP

## (undated) DEVICE — SINGLE PORT MANIFOLD: Brand: NEPTUNE 2

## (undated) DEVICE — SYRINGE MEDICAL 3ML CLEAR PLASTIC STANDARD NON CONTROL LUERLOCK TIP DISPOSABLE

## (undated) DEVICE — CORDIS XB3 CATHETER 100CM

## (undated) DEVICE — ENDOSCOPIC KIT 1.1+ OP4 CA DE 2 GWN AAMI LEVEL 3

## (undated) DEVICE — GUIDEWIRE 035IN 210CM PTFE COAT FIX COR J TIP 15MM FIRM BODY

## (undated) DEVICE — AIRLIFE™ OXYGEN TUBING 7 FEET (2.1 M) CRUSH RESISTANT OXYGEN TUBING, VINYL TIPPED: Brand: AIRLIFE™

## (undated) DEVICE — CATH BLLN ANGIO 2.25X20MM SC EUPHORA RX

## (undated) DEVICE — RADIFOCUS OPTITORQUE ANGIOGRAPHIC CATHETER: Brand: OPTITORQUE

## (undated) DEVICE — CONNECTOR TBNG OD5-7MM O2 END DISP

## (undated) DEVICE — HI-TORQUE WHISPER MS GUIDE WIRE .014 STRAIGHT TIP 3.0 CM X 190 CM: Brand: HI-TORQUE WHISPER

## (undated) DEVICE — CATHETER 5FR CORDIS PIG 145DEG 110CM

## (undated) DEVICE — GAUZE,SPONGE,4"X4",12PLY,WOVEN,NS,LF: Brand: MEDLINE

## (undated) DEVICE — BAND COMPR L24CM REG CLR PLAS HEMSTAT EXT HK AND LOOP RETEN

## (undated) DEVICE — CATH BLLN ANGIO 3X20MM NC EUPHORIA RX